# Patient Record
Sex: FEMALE | Race: WHITE | Employment: OTHER | URBAN - METROPOLITAN AREA
[De-identification: names, ages, dates, MRNs, and addresses within clinical notes are randomized per-mention and may not be internally consistent; named-entity substitution may affect disease eponyms.]

---

## 2018-06-16 ENCOUNTER — HOSPITAL ENCOUNTER (OUTPATIENT)
Age: 68
Discharge: HOME | End: 2018-06-18
Payer: MEDICARE

## 2018-06-16 ENCOUNTER — HOSPITAL ENCOUNTER (OUTPATIENT)
Dept: GENERAL RADIOLOGY | Age: 68
Discharge: HOME | End: 2018-06-18
Payer: MEDICARE

## 2018-06-16 DIAGNOSIS — Z09 FOLLOW UP: ICD-10-CM

## 2018-06-16 PROCEDURE — 71046 X-RAY EXAM CHEST 2 VIEWS: CPT

## 2019-05-20 ENCOUNTER — HOSPITAL ENCOUNTER (OUTPATIENT)
Dept: MAMMOGRAPHY | Age: 69
Discharge: HOME | End: 2019-05-22
Payer: MEDICARE

## 2019-05-20 DIAGNOSIS — Z12.39 BREAST CANCER SCREENING: ICD-10-CM

## 2019-05-20 PROCEDURE — 77063 BREAST TOMOSYNTHESIS BI: CPT

## 2019-06-28 ENCOUNTER — APPOINTMENT (OUTPATIENT)
Dept: GENERAL RADIOLOGY | Age: 69
End: 2019-06-28
Attending: FAMILY MEDICINE
Payer: MEDICARE

## 2019-06-28 ENCOUNTER — HOSPITAL ENCOUNTER (OUTPATIENT)
Age: 69
Setting detail: OBSERVATION
Discharge: HOME | End: 2019-06-29
Attending: FAMILY MEDICINE | Admitting: FAMILY MEDICINE
Payer: MEDICARE

## 2019-06-28 PROBLEM — R00.2 PALPITATIONS: Status: ACTIVE | Noted: 2019-06-28

## 2019-06-28 LAB
ANION GAP SERPL CALCULATED.3IONS-SCNC: 13 MMOL/L (ref 7–16)
BUN BLDV-MCNC: 8 MG/DL (ref 8–23)
CALCIUM SERPL-MCNC: 9.7 MG/DL (ref 8.6–10.2)
CHLORIDE BLD-SCNC: 105 MMOL/L (ref 98–107)
CK MB: 2.6 NG/ML (ref 0–4.3)
CO2: 24 MMOL/L (ref 22–29)
CREAT SERPL-MCNC: 0.8 MG/DL (ref 0.5–1)
GFR AFRICAN AMERICAN: >60
GFR NON-AFRICAN AMERICAN: >60 ML/MIN/1.73
GLUCOSE BLD-MCNC: 99 MG/DL (ref 74–99)
HCT VFR BLD CALC: 42.2 % (ref 34–48)
HEMOGLOBIN: 14 G/DL (ref 11.5–15.5)
MCH RBC QN AUTO: 29.2 PG (ref 26–35)
MCHC RBC AUTO-ENTMCNC: 33.2 % (ref 32–34.5)
MCV RBC AUTO: 88.1 FL (ref 80–99.9)
PDW BLD-RTO: 13.5 FL (ref 11.5–15)
PLATELET # BLD: 287 E9/L (ref 130–450)
PMV BLD AUTO: 9.6 FL (ref 7–12)
POTASSIUM SERPL-SCNC: 3.7 MMOL/L (ref 3.5–5)
RBC # BLD: 4.79 E12/L (ref 3.5–5.5)
SODIUM BLD-SCNC: 142 MMOL/L (ref 132–146)
TOTAL CK: 94 U/L (ref 20–180)
TROPONIN: <0.01 NG/ML (ref 0–0.03)
WBC # BLD: 5.3 E9/L (ref 4.5–11.5)

## 2019-06-28 PROCEDURE — G0378 HOSPITAL OBSERVATION PER HR: HCPCS

## 2019-06-28 PROCEDURE — 36415 COLL VENOUS BLD VENIPUNCTURE: CPT

## 2019-06-28 PROCEDURE — 84484 ASSAY OF TROPONIN QUANT: CPT

## 2019-06-28 PROCEDURE — 71046 X-RAY EXAM CHEST 2 VIEWS: CPT

## 2019-06-28 PROCEDURE — 6370000000 HC RX 637 (ALT 250 FOR IP): Performed by: FAMILY MEDICINE

## 2019-06-28 PROCEDURE — 80048 BASIC METABOLIC PNL TOTAL CA: CPT

## 2019-06-28 PROCEDURE — 85027 COMPLETE CBC AUTOMATED: CPT

## 2019-06-28 PROCEDURE — 82553 CREATINE MB FRACTION: CPT

## 2019-06-28 PROCEDURE — 93005 ELECTROCARDIOGRAM TRACING: CPT | Performed by: FAMILY MEDICINE

## 2019-06-28 PROCEDURE — 82550 ASSAY OF CK (CPK): CPT

## 2019-06-28 RX ORDER — AMOXICILLIN 500 MG/1
500 CAPSULE ORAL 3 TIMES DAILY
COMMUNITY
End: 2020-10-19

## 2019-06-28 RX ORDER — CITALOPRAM 10 MG/1
20 TABLET ORAL EVERY EVENING
Status: DISCONTINUED | OUTPATIENT
Start: 2019-06-28 | End: 2019-06-29 | Stop reason: HOSPADM

## 2019-06-28 RX ORDER — HYDROXYZINE PAMOATE 50 MG/1
50 CAPSULE ORAL EVERY 4 HOURS PRN
Status: DISCONTINUED | OUTPATIENT
Start: 2019-06-28 | End: 2019-06-29 | Stop reason: HOSPADM

## 2019-06-28 RX ORDER — HYDROXYZINE PAMOATE 50 MG/1
50 CAPSULE ORAL ONCE
Status: DISCONTINUED | OUTPATIENT
Start: 2019-06-28 | End: 2019-06-29 | Stop reason: HOSPADM

## 2019-06-28 RX ORDER — LORAZEPAM 2 MG/ML
0.5 INJECTION INTRAMUSCULAR EVERY 8 HOURS PRN
Status: DISCONTINUED | OUTPATIENT
Start: 2019-06-28 | End: 2019-06-29 | Stop reason: HOSPADM

## 2019-06-28 RX ADMIN — CITALOPRAM HYDROBROMIDE 20 MG: 10 TABLET ORAL at 21:47

## 2019-06-29 ENCOUNTER — APPOINTMENT (OUTPATIENT)
Dept: NUCLEAR MEDICINE | Age: 69
End: 2019-06-29
Attending: FAMILY MEDICINE
Payer: MEDICARE

## 2019-06-29 VITALS
HEIGHT: 70 IN | WEIGHT: 203.2 LBS | TEMPERATURE: 98.1 F | HEART RATE: 67 BPM | BODY MASS INDEX: 29.09 KG/M2 | SYSTOLIC BLOOD PRESSURE: 108 MMHG | OXYGEN SATURATION: 97 % | RESPIRATION RATE: 18 BRPM | DIASTOLIC BLOOD PRESSURE: 62 MMHG

## 2019-06-29 LAB
ALBUMIN SERPL-MCNC: 4.4 G/DL (ref 3.5–5.2)
ALP BLD-CCNC: 126 U/L (ref 35–104)
ALT SERPL-CCNC: 6 U/L (ref 0–32)
ANION GAP SERPL CALCULATED.3IONS-SCNC: 11 MMOL/L (ref 7–16)
AST SERPL-CCNC: 17 U/L (ref 0–31)
BILIRUB SERPL-MCNC: 0.4 MG/DL (ref 0–1.2)
BUN BLDV-MCNC: 11 MG/DL (ref 8–23)
CALCIUM SERPL-MCNC: 9.1 MG/DL (ref 8.6–10.2)
CHLORIDE BLD-SCNC: 106 MMOL/L (ref 98–107)
CHOLESTEROL, TOTAL: 183 MG/DL (ref 0–199)
CK MB: 2.1 NG/ML (ref 0–4.3)
CK MB: 2.4 NG/ML (ref 0–4.3)
CO2: 23 MMOL/L (ref 22–29)
CREAT SERPL-MCNC: 0.8 MG/DL (ref 0.5–1)
GFR AFRICAN AMERICAN: >60
GFR NON-AFRICAN AMERICAN: >60 ML/MIN/1.73
GLUCOSE BLD-MCNC: 107 MG/DL (ref 74–99)
HCT VFR BLD CALC: 42.3 % (ref 34–48)
HDLC SERPL-MCNC: 63 MG/DL
HEMOGLOBIN: 13.8 G/DL (ref 11.5–15.5)
LDL CHOLESTEROL CALCULATED: 106 MG/DL (ref 0–99)
LV EF: 71 %
LVEF MODALITY: NORMAL
MCH RBC QN AUTO: 28.6 PG (ref 26–35)
MCHC RBC AUTO-ENTMCNC: 32.6 % (ref 32–34.5)
MCV RBC AUTO: 87.6 FL (ref 80–99.9)
PDW BLD-RTO: 13.4 FL (ref 11.5–15)
PLATELET # BLD: 284 E9/L (ref 130–450)
PMV BLD AUTO: 9.5 FL (ref 7–12)
POTASSIUM SERPL-SCNC: 4.3 MMOL/L (ref 3.5–5)
RBC # BLD: 4.83 E12/L (ref 3.5–5.5)
SODIUM BLD-SCNC: 140 MMOL/L (ref 132–146)
TOTAL CK: 74 U/L (ref 20–180)
TOTAL CK: 83 U/L (ref 20–180)
TOTAL PROTEIN: 6 G/DL (ref 6.4–8.3)
TRIGL SERPL-MCNC: 72 MG/DL (ref 0–149)
TROPONIN: <0.01 NG/ML (ref 0–0.03)
TROPONIN: <0.01 NG/ML (ref 0–0.03)
TSH SERPL DL<=0.05 MIU/L-ACNC: 1.75 UIU/ML (ref 0.27–4.2)
VLDLC SERPL CALC-MCNC: 14 MG/DL
WBC # BLD: 4.6 E9/L (ref 4.5–11.5)

## 2019-06-29 PROCEDURE — 84484 ASSAY OF TROPONIN QUANT: CPT

## 2019-06-29 PROCEDURE — 80061 LIPID PANEL: CPT

## 2019-06-29 PROCEDURE — 84443 ASSAY THYROID STIM HORMONE: CPT

## 2019-06-29 PROCEDURE — 82553 CREATINE MB FRACTION: CPT

## 2019-06-29 PROCEDURE — G0378 HOSPITAL OBSERVATION PER HR: HCPCS

## 2019-06-29 PROCEDURE — 80053 COMPREHEN METABOLIC PANEL: CPT

## 2019-06-29 PROCEDURE — 93017 CV STRESS TEST TRACING ONLY: CPT

## 2019-06-29 PROCEDURE — 82550 ASSAY OF CK (CPK): CPT

## 2019-06-29 PROCEDURE — 3430000000 HC RX DIAGNOSTIC RADIOPHARMACEUTICAL: Performed by: RADIOLOGY

## 2019-06-29 PROCEDURE — A9500 TC99M SESTAMIBI: HCPCS | Performed by: RADIOLOGY

## 2019-06-29 PROCEDURE — 78452 HT MUSCLE IMAGE SPECT MULT: CPT

## 2019-06-29 PROCEDURE — 85027 COMPLETE CBC AUTOMATED: CPT

## 2019-06-29 PROCEDURE — 36415 COLL VENOUS BLD VENIPUNCTURE: CPT

## 2019-06-29 PROCEDURE — 6360000002 HC RX W HCPCS: Performed by: FAMILY MEDICINE

## 2019-06-29 RX ORDER — HYDROXYZINE PAMOATE 50 MG/1
50 CAPSULE ORAL EVERY 4 HOURS PRN
Qty: 1 CAPSULE | Refills: 0
Start: 2019-06-29 | End: 2019-07-13

## 2019-06-29 RX ORDER — CITALOPRAM 20 MG/1
20 TABLET ORAL EVERY EVENING
Qty: 1 TABLET | Refills: 0
Start: 2019-06-29 | End: 2020-10-19

## 2019-06-29 RX ADMIN — REGADENOSON 0.4 MG: 0.08 INJECTION, SOLUTION INTRAVENOUS at 10:56

## 2019-06-29 RX ADMIN — Medication 30 MILLICURIE: at 10:38

## 2019-06-29 RX ADMIN — Medication 10 MILLICURIE: at 08:50

## 2019-06-29 NOTE — PROGRESS NOTES
Per patient, Dr. Bhatt Levels previously gave prescriptions for new  Medications on discharge to her .   Electronically signed by Kashif Agrawal RN on 6/29/2019 at 2:07 PM

## 2019-06-29 NOTE — H&P
Bowel  sounds are positive. EXTREMITIES:  No clubbing, no edema, no cyanosis. Dorsalis 1+. NEUROLOGIC:  Cranial nerves II through XII intact distally, grossly  intact. No focal neurological deficits. Workup revealed the following - chest x-ray revealed no acute  cardiopulmonary disease. CBC is within normal limits, cardiac enzymes  were negative x1. ASSESSMENT:  1. Cardiac palpitations, SVT, and V-tach. 2.  Chest pain - rule out coronary artery disease. 3.  Severe anxiety. 4.  Panic attacks. PLAN:  Tollie Galilea will be started for the severe anxiety and this  will be administered for the underlying panic attacks. Nitroglycerin  will also be administered for underlying chest pains, cardiac enzymes  will be cycled. Heart monitor will be performed and a stress test will  be also ordered. The patient will be monitored very closely over the  next 24 hours.         Fior Yañez MD    D: 06/29/2019 9:58:22       T: 06/29/2019 10:09:55     [id number]  Job#: [id number]     Doc#: [id number]    CC:

## 2019-06-30 LAB
EKG ATRIAL RATE: 67 BPM
EKG P AXIS: 43 DEGREES
EKG P-R INTERVAL: 156 MS
EKG Q-T INTERVAL: 408 MS
EKG QRS DURATION: 82 MS
EKG QTC CALCULATION (BAZETT): 431 MS
EKG R AXIS: 25 DEGREES
EKG T AXIS: 88 DEGREES
EKG VENTRICULAR RATE: 67 BPM

## 2019-10-15 ENCOUNTER — HOSPITAL ENCOUNTER (OUTPATIENT)
Age: 69
Discharge: HOME | End: 2019-10-15
Payer: MEDICARE

## 2019-10-15 ENCOUNTER — HOSPITAL ENCOUNTER (OUTPATIENT)
Dept: ULTRASOUND IMAGING | Age: 69
Discharge: HOME | End: 2019-10-15
Payer: MEDICARE

## 2019-10-15 DIAGNOSIS — R60.9 EDEMA, UNSPECIFIED TYPE: ICD-10-CM

## 2019-10-15 PROCEDURE — 93971 EXTREMITY STUDY: CPT

## 2020-10-19 ENCOUNTER — APPOINTMENT (OUTPATIENT)
Dept: CT IMAGING | Age: 70
DRG: 392 | End: 2020-10-19
Payer: MEDICARE

## 2020-10-19 ENCOUNTER — HOSPITAL ENCOUNTER (INPATIENT)
Age: 70
LOS: 3 days | Discharge: HOME | DRG: 392 | End: 2020-10-22
Attending: EMERGENCY MEDICINE | Admitting: SURGERY
Payer: MEDICARE

## 2020-10-19 PROBLEM — K57.92 ACUTE DIVERTICULITIS: Status: ACTIVE | Noted: 2020-10-19

## 2020-10-19 LAB
ALBUMIN SERPL-MCNC: 4.2 G/DL (ref 3.5–5.2)
ALP BLD-CCNC: 136 U/L (ref 35–104)
ALT SERPL-CCNC: 10 U/L (ref 0–32)
ANION GAP SERPL CALCULATED.3IONS-SCNC: 9 MMOL/L (ref 7–16)
AST SERPL-CCNC: 26 U/L (ref 0–31)
BASOPHILS ABSOLUTE: 0.03 E9/L (ref 0–0.2)
BASOPHILS RELATIVE PERCENT: 0.2 % (ref 0–2)
BILIRUB SERPL-MCNC: 0.7 MG/DL (ref 0–1.2)
BILIRUBIN URINE: NEGATIVE
BLOOD, URINE: NEGATIVE
BUN BLDV-MCNC: 12 MG/DL (ref 8–23)
CALCIUM SERPL-MCNC: 9.4 MG/DL (ref 8.6–10.2)
CHLORIDE BLD-SCNC: 102 MMOL/L (ref 98–107)
CLARITY: CLEAR
CO2: 25 MMOL/L (ref 22–29)
COLOR: YELLOW
CREAT SERPL-MCNC: 0.8 MG/DL (ref 0.5–1)
EOSINOPHILS ABSOLUTE: 0.07 E9/L (ref 0.05–0.5)
EOSINOPHILS RELATIVE PERCENT: 0.5 % (ref 0–6)
GFR AFRICAN AMERICAN: >60
GFR NON-AFRICAN AMERICAN: >60 ML/MIN/1.73
GLUCOSE BLD-MCNC: 99 MG/DL (ref 74–99)
GLUCOSE URINE: NEGATIVE MG/DL
HCT VFR BLD CALC: 41.7 % (ref 34–48)
HEMOGLOBIN: 14.2 G/DL (ref 11.5–15.5)
IMMATURE GRANULOCYTES #: 0.06 E9/L
IMMATURE GRANULOCYTES %: 0.5 % (ref 0–5)
KETONES, URINE: NEGATIVE MG/DL
LACTIC ACID: 1.5 MMOL/L (ref 0.5–2.2)
LEUKOCYTE ESTERASE, URINE: NEGATIVE
LIPASE: 33 U/L (ref 13–60)
LYMPHOCYTES ABSOLUTE: 0.96 E9/L (ref 1.5–4)
LYMPHOCYTES RELATIVE PERCENT: 7.3 % (ref 20–42)
MCH RBC QN AUTO: 29.8 PG (ref 26–35)
MCHC RBC AUTO-ENTMCNC: 34.1 % (ref 32–34.5)
MCV RBC AUTO: 87.4 FL (ref 80–99.9)
MONOCYTES ABSOLUTE: 0.76 E9/L (ref 0.1–0.95)
MONOCYTES RELATIVE PERCENT: 5.8 % (ref 2–12)
NEUTROPHILS ABSOLUTE: 11.3 E9/L (ref 1.8–7.3)
NEUTROPHILS RELATIVE PERCENT: 85.7 % (ref 43–80)
NITRITE, URINE: NEGATIVE
PDW BLD-RTO: 13.6 FL (ref 11.5–15)
PH UA: 6.5 (ref 5–9)
PLATELET # BLD: 268 E9/L (ref 130–450)
PMV BLD AUTO: 9.3 FL (ref 7–12)
POTASSIUM REFLEX MAGNESIUM: 4 MMOL/L (ref 3.5–5)
PROTEIN UA: NEGATIVE MG/DL
RBC # BLD: 4.77 E12/L (ref 3.5–5.5)
SODIUM BLD-SCNC: 136 MMOL/L (ref 132–146)
SPECIFIC GRAVITY UA: 1.01 (ref 1–1.03)
TOTAL PROTEIN: 6.3 G/DL (ref 6.4–8.3)
TROPONIN: <0.01 NG/ML (ref 0–0.03)
UROBILINOGEN, URINE: 0.2 E.U./DL
WBC # BLD: 13.2 E9/L (ref 4.5–11.5)

## 2020-10-19 PROCEDURE — 96375 TX/PRO/DX INJ NEW DRUG ADDON: CPT

## 2020-10-19 PROCEDURE — 6360000002 HC RX W HCPCS: Performed by: SURGERY

## 2020-10-19 PROCEDURE — 85025 COMPLETE CBC W/AUTO DIFF WBC: CPT

## 2020-10-19 PROCEDURE — 6360000002 HC RX W HCPCS: Performed by: STUDENT IN AN ORGANIZED HEALTH CARE EDUCATION/TRAINING PROGRAM

## 2020-10-19 PROCEDURE — 83690 ASSAY OF LIPASE: CPT

## 2020-10-19 PROCEDURE — 84484 ASSAY OF TROPONIN QUANT: CPT

## 2020-10-19 PROCEDURE — 1200000000 HC SEMI PRIVATE

## 2020-10-19 PROCEDURE — 2580000003 HC RX 258: Performed by: SURGERY

## 2020-10-19 PROCEDURE — 81003 URINALYSIS AUTO W/O SCOPE: CPT

## 2020-10-19 PROCEDURE — 74177 CT ABD & PELVIS W/CONTRAST: CPT

## 2020-10-19 PROCEDURE — 93005 ELECTROCARDIOGRAM TRACING: CPT | Performed by: STUDENT IN AN ORGANIZED HEALTH CARE EDUCATION/TRAINING PROGRAM

## 2020-10-19 PROCEDURE — 99284 EMERGENCY DEPT VISIT MOD MDM: CPT

## 2020-10-19 PROCEDURE — 83605 ASSAY OF LACTIC ACID: CPT

## 2020-10-19 PROCEDURE — 2500000003 HC RX 250 WO HCPCS: Performed by: STUDENT IN AN ORGANIZED HEALTH CARE EDUCATION/TRAINING PROGRAM

## 2020-10-19 PROCEDURE — 2580000003 HC RX 258: Performed by: STUDENT IN AN ORGANIZED HEALTH CARE EDUCATION/TRAINING PROGRAM

## 2020-10-19 PROCEDURE — 6360000004 HC RX CONTRAST MEDICATION: Performed by: RADIOLOGY

## 2020-10-19 PROCEDURE — 36415 COLL VENOUS BLD VENIPUNCTURE: CPT

## 2020-10-19 PROCEDURE — 80053 COMPREHEN METABOLIC PANEL: CPT

## 2020-10-19 PROCEDURE — 96365 THER/PROPH/DIAG IV INF INIT: CPT

## 2020-10-19 RX ORDER — ONDANSETRON 2 MG/ML
4 INJECTION INTRAMUSCULAR; INTRAVENOUS ONCE
Status: COMPLETED | OUTPATIENT
Start: 2020-10-19 | End: 2020-10-19

## 2020-10-19 RX ORDER — MORPHINE SULFATE 4 MG/ML
4 INJECTION, SOLUTION INTRAMUSCULAR; INTRAVENOUS
Status: DISCONTINUED | OUTPATIENT
Start: 2020-10-19 | End: 2020-10-21

## 2020-10-19 RX ORDER — SODIUM CHLORIDE 0.9 % (FLUSH) 0.9 %
10 SYRINGE (ML) INJECTION EVERY 12 HOURS SCHEDULED
Status: DISCONTINUED | OUTPATIENT
Start: 2020-10-19 | End: 2020-10-22 | Stop reason: HOSPADM

## 2020-10-19 RX ORDER — SODIUM CHLORIDE 0.9 % (FLUSH) 0.9 %
10 SYRINGE (ML) INJECTION PRN
Status: DISCONTINUED | OUTPATIENT
Start: 2020-10-19 | End: 2020-10-22 | Stop reason: HOSPADM

## 2020-10-19 RX ORDER — OXYCODONE HYDROCHLORIDE 5 MG/1
5 TABLET ORAL EVERY 4 HOURS PRN
Status: DISCONTINUED | OUTPATIENT
Start: 2020-10-19 | End: 2020-10-22 | Stop reason: HOSPADM

## 2020-10-19 RX ORDER — FENTANYL CITRATE 50 UG/ML
25 INJECTION, SOLUTION INTRAMUSCULAR; INTRAVENOUS ONCE
Status: COMPLETED | OUTPATIENT
Start: 2020-10-19 | End: 2020-10-19

## 2020-10-19 RX ORDER — MORPHINE SULFATE 2 MG/ML
2 INJECTION, SOLUTION INTRAMUSCULAR; INTRAVENOUS
Status: DISCONTINUED | OUTPATIENT
Start: 2020-10-19 | End: 2020-10-21

## 2020-10-19 RX ORDER — SODIUM CHLORIDE 9 MG/ML
INJECTION, SOLUTION INTRAVENOUS CONTINUOUS
Status: DISCONTINUED | OUTPATIENT
Start: 2020-10-19 | End: 2020-10-22 | Stop reason: HOSPADM

## 2020-10-19 RX ORDER — ONDANSETRON 2 MG/ML
4 INJECTION INTRAMUSCULAR; INTRAVENOUS EVERY 6 HOURS PRN
Status: DISCONTINUED | OUTPATIENT
Start: 2020-10-19 | End: 2020-10-22 | Stop reason: HOSPADM

## 2020-10-19 RX ORDER — PANTOPRAZOLE SODIUM 40 MG/10ML
40 INJECTION, POWDER, LYOPHILIZED, FOR SOLUTION INTRAVENOUS DAILY
Status: DISCONTINUED | OUTPATIENT
Start: 2020-10-20 | End: 2020-10-22 | Stop reason: HOSPADM

## 2020-10-19 RX ORDER — SODIUM CHLORIDE 9 MG/ML
10 INJECTION INTRAVENOUS DAILY
Status: DISCONTINUED | OUTPATIENT
Start: 2020-10-20 | End: 2020-10-22 | Stop reason: HOSPADM

## 2020-10-19 RX ORDER — 0.9 % SODIUM CHLORIDE 0.9 %
1000 INTRAVENOUS SOLUTION INTRAVENOUS ONCE
Status: COMPLETED | OUTPATIENT
Start: 2020-10-19 | End: 2020-10-19

## 2020-10-19 RX ORDER — ONDANSETRON 4 MG/1
4 TABLET, ORALLY DISINTEGRATING ORAL EVERY 8 HOURS PRN
Status: DISCONTINUED | OUTPATIENT
Start: 2020-10-19 | End: 2020-10-22 | Stop reason: HOSPADM

## 2020-10-19 RX ORDER — OXYCODONE HYDROCHLORIDE 5 MG/1
10 TABLET ORAL EVERY 4 HOURS PRN
Status: DISCONTINUED | OUTPATIENT
Start: 2020-10-19 | End: 2020-10-22 | Stop reason: HOSPADM

## 2020-10-19 RX ADMIN — METRONIDAZOLE 500 MG: 500 INJECTION, SOLUTION INTRAVENOUS at 20:17

## 2020-10-19 RX ADMIN — MORPHINE SULFATE 2 MG: 2 INJECTION, SOLUTION INTRAMUSCULAR; INTRAVENOUS at 23:27

## 2020-10-19 RX ADMIN — ONDANSETRON 4 MG: 2 INJECTION INTRAMUSCULAR; INTRAVENOUS at 18:28

## 2020-10-19 RX ADMIN — IOPAMIDOL 75 ML: 755 INJECTION, SOLUTION INTRAVENOUS at 19:42

## 2020-10-19 RX ADMIN — SODIUM CHLORIDE: 9 INJECTION, SOLUTION INTRAVENOUS at 23:26

## 2020-10-19 RX ADMIN — WATER 1 G: 1 INJECTION INTRAMUSCULAR; INTRAVENOUS; SUBCUTANEOUS at 20:17

## 2020-10-19 RX ADMIN — Medication 10 ML: at 22:15

## 2020-10-19 RX ADMIN — SODIUM CHLORIDE 1000 ML: 9 INJECTION, SOLUTION INTRAVENOUS at 18:28

## 2020-10-19 RX ADMIN — FENTANYL CITRATE 25 MCG: 50 INJECTION, SOLUTION INTRAMUSCULAR; INTRAVENOUS at 18:29

## 2020-10-19 ASSESSMENT — PAIN SCALES - GENERAL
PAINLEVEL_OUTOF10: 5
PAINLEVEL_OUTOF10: 8
PAINLEVEL_OUTOF10: 8
PAINLEVEL_OUTOF10: 0
PAINLEVEL_OUTOF10: 6
PAINLEVEL_OUTOF10: 9

## 2020-10-19 ASSESSMENT — ENCOUNTER SYMPTOMS
BACK PAIN: 0
APNEA: 0
EYE PAIN: 0
DIARRHEA: 0
RHINORRHEA: 0
COUGH: 0
CONSTIPATION: 0
SHORTNESS OF BREATH: 0
NAUSEA: 1
PHOTOPHOBIA: 0
CHEST TIGHTNESS: 0
TROUBLE SWALLOWING: 0
VOMITING: 0
SORE THROAT: 0
WHEEZING: 0
ABDOMINAL PAIN: 1

## 2020-10-19 ASSESSMENT — PAIN DESCRIPTION - PAIN TYPE
TYPE: ACUTE PAIN
TYPE: ACUTE PAIN

## 2020-10-19 ASSESSMENT — PAIN DESCRIPTION - ORIENTATION
ORIENTATION: RIGHT
ORIENTATION: LEFT;LOWER

## 2020-10-19 ASSESSMENT — PAIN DESCRIPTION - LOCATION
LOCATION: ABDOMEN

## 2020-10-19 ASSESSMENT — PAIN DESCRIPTION - DESCRIPTORS
DESCRIPTORS: ACHING;DISCOMFORT;NAGGING
DESCRIPTORS: ACHING;DISCOMFORT;SHOOTING
DESCRIPTORS: ACHING;SHARP

## 2020-10-19 ASSESSMENT — PAIN - FUNCTIONAL ASSESSMENT
PAIN_FUNCTIONAL_ASSESSMENT: PREVENTS OR INTERFERES SOME ACTIVE ACTIVITIES AND ADLS
PAIN_FUNCTIONAL_ASSESSMENT: PREVENTS OR INTERFERES SOME ACTIVE ACTIVITIES AND ADLS

## 2020-10-19 ASSESSMENT — PAIN DESCRIPTION - FREQUENCY: FREQUENCY: INTERMITTENT

## 2020-10-19 NOTE — ED PROVIDER NOTES
[address] [facility]      Pt Name: Katelin Stephens  MRN: [id number]  [place] 1950  Date of evaluation: 10/19/2020      CHIEF COMPLAINT       Chief Complaint   Patient presents with    Abdominal Pain     LLQ, started this am     Nausea        HPI  Katelin Stephens is a 77 y.o. female who presents to the emergency department with complaints of left lower quadrant abdominal pain. The patient states that this morning she began to have gradual onset left lower quadrant abdominal pain that is worsened throughout the day. She describes the pain as moderate, a \"grabbing\" achy pain. Nothing seems to make it better or worse. She has associated mild nausea but no vomiting. She denies any fevers, chills, lightheadedness, syncope, chest pain or shortness of breath. She had diverticulitis once in the past but is unsure if this feels like that. History of cholecystectomy no other abdominal surgeries. She denies any urinary symptoms. Denies any diarrhea, constipation, black or bloody stools. Except as noted above the remainder of the review of systems was reviewed and negative. Review of Systems   Constitutional: Negative for chills, diaphoresis, fatigue and fever. HENT: Negative for rhinorrhea, sore throat and trouble swallowing. Eyes: Negative for photophobia and pain. Respiratory: Negative for apnea, cough, chest tightness, shortness of breath and wheezing. Cardiovascular: Negative for chest pain, palpitations and leg swelling. Gastrointestinal: Positive for abdominal pain and nausea. Negative for constipation, diarrhea and vomiting. Endocrine: Negative for polyuria. Genitourinary: Negative for difficulty urinating and dysuria. Musculoskeletal: Negative for back pain, neck pain and neck stiffness. Skin: Negative for pallor and rash. Neurological: Negative for dizziness, speech difficulty, weakness, light-headedness and headaches. Psychiatric/Behavioral: Negative for confusion. The patient is not nervous/anxious. Physical Exam  Vitals signs and nursing note reviewed. Constitutional:       General: She is not in acute distress. Appearance: She is well-developed. Comments: Awake and alert. Sitting in the gurney in no obvious distress. HENT:      Head: Normocephalic and atraumatic. Right Ear: External ear normal.      Left Ear: External ear normal.      Mouth/Throat:      Mouth: Mucous membranes are moist.   Eyes:      General: No scleral icterus. Pupils: Pupils are equal, round, and reactive to light. Neck:      Musculoskeletal: Normal range of motion and neck supple. Cardiovascular:      Rate and Rhythm: Normal rate and regular rhythm. Heart sounds: No murmur. Comments: 2+ radial and dorsal pedis pulses bilaterally  Pulmonary:      Effort: Pulmonary effort is normal. No respiratory distress. Breath sounds: Normal breath sounds. No wheezing. Abdominal:      Palpations: Abdomen is soft. Tenderness: There is abdominal tenderness. There is no guarding or rebound. Comments: Left lower quadrant abdominal tenderness to palpation. No rebound or guarding. Musculoskeletal: Normal range of motion. General: No tenderness or deformity. Right lower leg: No edema. Left lower leg: No edema. Skin:     General: Skin is warm and dry. Capillary Refill: Capillary refill takes less than 2 seconds. Neurological:      General: No focal deficit present. Mental Status: She is alert and oriented to person, place, and time. Cranial Nerves: No cranial nerve deficit. Sensory: No sensory deficit. Motor: No weakness or abnormal muscle tone.    Psychiatric:         Mood and Affect: Mood normal.         Behavior: Behavior normal.          Procedures     MDM   This is a 46-year-old female with a history of previous diverticulitis who presents to the emergency department with complaints of acute onset left lower quadrant abdominal pain. In the emergency department the patient is awake and alert, hemodynamically stable, afebrile and in no respiratory distress. CT abdomen pelvis with IV contrast with acute diverticulitis in the descending colon with signs of perforation. No abscess. Metabolic panel shows normal electrolytes, normal renal function with creatinine of 0.8. Lactic acid reassuring at 1.5. EKG showed no ischemic changes troponin was negative and less likely acute cardiac etiology of her symptoms. CBC showed leukocytosis of 13.2 with left shift. Discussed the case with Dr. Dylon Ritchie, general surgery who accepts the patient for further evaluation giving the signs of perforation on CT scan. Patient's pain adequately controlled in the emergency department and she had no further nausea or vomiting. Discussed results with her and she is agreeable to plan for admission. Patient started on Rocephin and Flagyl in the emergency department. ED Course as of Oct 19 2236   Mon Oct 19, 2020   1752 ATTENDING PROVIDER ATTESTATION:     Keiko Stapleton presented to the emergency department for evaluation of [unfilled]  I have reviewed and discussed the case, including pertinent history (medical, surgical, family and social) and exam findings with the Midlevel and the Nurse assigned to Keiko Stapleton. I have personally performed and/or participated in the history, exam, medical decision making, and procedures and agree with all pertinent clinical information. I have reviewed my findings and recommendations with Keiko Daya and members of family present at the time of disposition. My findings/plan: Patient is a 78-year-old female who presents with chief complaint of nausea and abdominal pain.   Patient states that earlier today she had onset of left lower quadrant abdominal pain that was more of a dull sensation that has become more constant dull pain that is intermittently sharp. The pain is nonradiating. She does admit to nausea but no vomiting. Patient does have a history of diverticulitis in the past.  No abdominal surgeries. She denies any fevers, chills, chest pain, shortness of breath, vomiting, dysuria, hematuria, diarrhea, constipation. Nothing makes her symptoms better or worse. On examination she is resting comfortably in bed. Normal HEENT exam.  Clear breath sounds in all lung fields. Regular rate and rhythm of the heart. Left lower quadrant abdominal tenderness to palpation. Abdomen is soft, nondistended. No guarding rigidity. No rebound tenderness. No lower extremity edema. Patient is awake, alert, and oriented x3. No focal neurological deficits. Yazmin Bonilla DO      [MS]      ED Course User Index  [MS] Agus MckinneyDO lucy       --------------------------------------------- PAST HISTORY ---------------------------------------------  Past Medical History:  has a past medical history of Diverticulitis, H/O cardiovascular stress test, and Pneumonia. Past Surgical History:  has a past surgical history that includes gastrectomy and Cholecystectomy. Social History:  reports that she has never smoked. She has never used smokeless tobacco. She reports that she does not drink alcohol or use drugs. Family History: family history includes Other in her father and mother. The patients home medications have been reviewed.     Allergies: Demerol and Levofloxacin    -------------------------------------------------- RESULTS -------------------------------------------------    LABS:  Results for orders placed or performed during the hospital encounter of 10/19/20   CBC Auto Differential   Result Value Ref Range    WBC 13.2 (H) 4.5 - 11.5 E9/L    RBC 4.77 3.50 - 5.50 E12/L    Hemoglobin 14.2 11.5 - 15.5 g/dL    Hematocrit 41.7 34.0 - 48.0 %    MCV 87.4 80.0 - 99.9 fL    MCH 29.8 26.0 - 35.0 pg    MCHC 34.1 32.0 - 34.5 % RDW 13.6 11.5 - 15.0 fL    Platelets 182 050 - 737 E9/L    MPV 9.3 7.0 - 12.0 fL    Neutrophils % 85.7 (H) 43.0 - 80.0 %    Immature Granulocytes % 0.5 0.0 - 5.0 %    Lymphocytes % 7.3 (L) 20.0 - 42.0 %    Monocytes % 5.8 2.0 - 12.0 %    Eosinophils % 0.5 0.0 - 6.0 %    Basophils % 0.2 0.0 - 2.0 %    Neutrophils Absolute 11.30 (H) 1.80 - 7.30 E9/L    Immature Granulocytes # 0.06 E9/L    Lymphocytes Absolute 0.96 (L) 1.50 - 4.00 E9/L    Monocytes Absolute 0.76 0.10 - 0.95 E9/L    Eosinophils Absolute 0.07 0.05 - 0.50 E9/L    Basophils Absolute 0.03 0.00 - 0.20 E9/L   Comprehensive Metabolic Panel w/ Reflex to MG   Result Value Ref Range    Sodium 136 132 - 146 mmol/L    Potassium reflex Magnesium 4.0 3.5 - 5.0 mmol/L    Chloride 102 98 - 107 mmol/L    CO2 25 22 - 29 mmol/L    Anion Gap 9 7 - 16 mmol/L    Glucose 99 74 - 99 mg/dL    BUN 12 8 - 23 mg/dL    CREATININE 0.8 0.5 - 1.0 mg/dL    GFR Non-African American >60 >=60 mL/min/1.73    GFR African American >60     Calcium 9.4 8.6 - 10.2 mg/dL    Total Protein 6.3 (L) 6.4 - 8.3 g/dL    Alb 4.2 3.5 - 5.2 g/dL    Total Bilirubin 0.7 0.0 - 1.2 mg/dL    Alkaline Phosphatase 136 (H) 35 - 104 U/L    ALT 10 0 - 32 U/L    AST 26 0 - 31 U/L   Lipase   Result Value Ref Range    Lipase 33 13 - 60 U/L   Troponin   Result Value Ref Range    Troponin <0.01 0.00 - 0.03 ng/mL   Urinalysis, reflex to microscopic   Result Value Ref Range    Color, UA Yellow Straw/Yellow    Clarity, UA Clear Clear    Glucose, Ur Negative Negative mg/dL    Bilirubin Urine Negative Negative    Ketones, Urine Negative Negative mg/dL    Specific Gravity, UA 1.010 1.005 - 1.030    Blood, Urine Negative Negative    pH, UA 6.5 5.0 - 9.0    Protein, UA Negative Negative mg/dL    Urobilinogen, Urine 0.2 <2.0 E.U./dL    Nitrite, Urine Negative Negative    Leukocyte Esterase, Urine Negative Negative   Lactic Acid, Plasma   Result Value Ref Range    Lactic Acid 1.5 0.5 - 2.2 mmol/L   EKG 12 Lead   Result Value Ref Range    Ventricular Rate 71 BPM    Atrial Rate 241 BPM    QRS Duration 78 ms    Q-T Interval 354 ms    QTc Calculation (Bazett) 384 ms    P Axis 39 degrees    R Axis -28 degrees    T Axis 72 degrees       RADIOLOGY:  CT ABDOMEN PELVIS W IV CONTRAST Additional Contrast? None   Final Result   Acute diverticulitis of the distal descending colon/proximal sigmoid colon   with perforation. No pericolonic abscess identified. Appendix is normal in appearance. EKG:  This EKG is signed and interpreted by me. Rate: 71bpm  Rhythm: sinus  Interpretation: Normal axis. No ST segment elevation or depression. Artifact. Nonspecific T wave changes. Comparison: Compared to EKG from June 20, 2019 new artifact today no other clear changes. ------------------------- NURSING NOTES AND VITALS REVIEWED ---------------------------  Date / Time Roomed:  10/19/2020  5:11 PM  ED Bed Assignment:  [id number]/0338-02    The nursing notes within the ED encounter and vital signs as below have been reviewed. Patient Vitals for the past 24 hrs:   BP Temp Temp src Pulse Resp SpO2 Height Weight   10/19/20 2145 128/68 98.8 °F (37.1 °C) Oral 78 16 98 % 5' 10\" (1.778 m) 200 lb (90.7 kg)   10/19/20 2016 134/82 -- -- 78 26 98 % -- --   10/19/20 1914 -- -- -- 82 17 99 % -- --   10/19/20 1913 -- -- -- 74 19 99 % -- --   10/19/20 1912 -- -- -- 75 19 98 % -- --   10/19/20 1911 -- -- -- 80 17 98 % -- --   10/19/20 1737 (!) 148/80 -- -- 72 18 98 % -- --   10/19/20 1624 (!) 168/82 98 °F (36.7 °C) Oral 85 22 98 % -- --   10/19/20 1611 -- 96.4 °F (35.8 °C) Tympanic -- -- -- -- --       Oxygen Saturation Interpretation: Normal    ------------------------------------------ PROGRESS NOTES ------------------------------------------    Counseling:  I have spoken with the patient and discussed todays results, in addition to providing specific details for the plan of care and counseling regarding the diagnosis and prognosis.   Their questions are

## 2020-10-20 PROBLEM — K57.20 PERFORATED DIVERTICULUM OF LARGE INTESTINE: Status: ACTIVE | Noted: 2020-10-20

## 2020-10-20 LAB
ANION GAP SERPL CALCULATED.3IONS-SCNC: 10 MMOL/L (ref 7–16)
BASOPHILS ABSOLUTE: 0.03 E9/L (ref 0–0.2)
BASOPHILS RELATIVE PERCENT: 0.2 % (ref 0–2)
BUN BLDV-MCNC: 10 MG/DL (ref 8–23)
CALCIUM SERPL-MCNC: 8.7 MG/DL (ref 8.6–10.2)
CHLORIDE BLD-SCNC: 107 MMOL/L (ref 98–107)
CO2: 22 MMOL/L (ref 22–29)
CREAT SERPL-MCNC: 0.8 MG/DL (ref 0.5–1)
EKG ATRIAL RATE: 241 BPM
EKG P AXIS: 39 DEGREES
EKG Q-T INTERVAL: 354 MS
EKG QRS DURATION: 78 MS
EKG QTC CALCULATION (BAZETT): 384 MS
EKG R AXIS: -28 DEGREES
EKG T AXIS: 72 DEGREES
EKG VENTRICULAR RATE: 71 BPM
EOSINOPHILS ABSOLUTE: 0.07 E9/L (ref 0.05–0.5)
EOSINOPHILS RELATIVE PERCENT: 0.5 % (ref 0–6)
GFR AFRICAN AMERICAN: >60
GFR NON-AFRICAN AMERICAN: >60 ML/MIN/1.73
GLUCOSE BLD-MCNC: 117 MG/DL (ref 74–99)
HCT VFR BLD CALC: 37.7 % (ref 34–48)
HEMOGLOBIN: 12.8 G/DL (ref 11.5–15.5)
IMMATURE GRANULOCYTES #: 0.06 E9/L
IMMATURE GRANULOCYTES %: 0.5 % (ref 0–5)
LACTIC ACID, SEPSIS: 0.9 MMOL/L (ref 0.5–1.9)
LACTIC ACID, SEPSIS: 0.9 MMOL/L (ref 0.5–1.9)
LYMPHOCYTES ABSOLUTE: 1.29 E9/L (ref 1.5–4)
LYMPHOCYTES RELATIVE PERCENT: 10.1 % (ref 20–42)
MCH RBC QN AUTO: 29.7 PG (ref 26–35)
MCHC RBC AUTO-ENTMCNC: 34 % (ref 32–34.5)
MCV RBC AUTO: 87.5 FL (ref 80–99.9)
MONOCYTES ABSOLUTE: 1.08 E9/L (ref 0.1–0.95)
MONOCYTES RELATIVE PERCENT: 8.4 % (ref 2–12)
NEUTROPHILS ABSOLUTE: 10.29 E9/L (ref 1.8–7.3)
NEUTROPHILS RELATIVE PERCENT: 80.3 % (ref 43–80)
PDW BLD-RTO: 13.7 FL (ref 11.5–15)
PLATELET # BLD: 239 E9/L (ref 130–450)
PMV BLD AUTO: 9.5 FL (ref 7–12)
POTASSIUM REFLEX MAGNESIUM: 3.9 MMOL/L (ref 3.5–5)
RBC # BLD: 4.31 E12/L (ref 3.5–5.5)
SODIUM BLD-SCNC: 139 MMOL/L (ref 132–146)
WBC # BLD: 12.8 E9/L (ref 4.5–11.5)

## 2020-10-20 PROCEDURE — 85025 COMPLETE CBC W/AUTO DIFF WBC: CPT

## 2020-10-20 PROCEDURE — 2580000003 HC RX 258: Performed by: SURGERY

## 2020-10-20 PROCEDURE — 83605 ASSAY OF LACTIC ACID: CPT

## 2020-10-20 PROCEDURE — 1200000000 HC SEMI PRIVATE

## 2020-10-20 PROCEDURE — 93010 ELECTROCARDIOGRAM REPORT: CPT | Performed by: INTERNAL MEDICINE

## 2020-10-20 PROCEDURE — C9113 INJ PANTOPRAZOLE SODIUM, VIA: HCPCS | Performed by: SURGERY

## 2020-10-20 PROCEDURE — 99223 1ST HOSP IP/OBS HIGH 75: CPT | Performed by: SURGERY

## 2020-10-20 PROCEDURE — 36415 COLL VENOUS BLD VENIPUNCTURE: CPT

## 2020-10-20 PROCEDURE — 2500000003 HC RX 250 WO HCPCS: Performed by: SURGERY

## 2020-10-20 PROCEDURE — 6360000002 HC RX W HCPCS: Performed by: SURGERY

## 2020-10-20 PROCEDURE — 80048 BASIC METABOLIC PNL TOTAL CA: CPT

## 2020-10-20 PROCEDURE — 87040 BLOOD CULTURE FOR BACTERIA: CPT

## 2020-10-20 RX ORDER — SODIUM CHLORIDE 0.9 % (FLUSH) 0.9 %
10 SYRINGE (ML) INJECTION PRN
Status: DISCONTINUED | OUTPATIENT
Start: 2020-10-20 | End: 2020-10-22 | Stop reason: HOSPADM

## 2020-10-20 RX ORDER — SODIUM CHLORIDE 0.9 % (FLUSH) 0.9 %
10 SYRINGE (ML) INJECTION EVERY 12 HOURS SCHEDULED
Status: DISCONTINUED | OUTPATIENT
Start: 2020-10-20 | End: 2020-10-20 | Stop reason: SDUPTHER

## 2020-10-20 RX ADMIN — SODIUM CHLORIDE: 9 INJECTION, SOLUTION INTRAVENOUS at 15:51

## 2020-10-20 RX ADMIN — METRONIDAZOLE 500 MG: 500 INJECTION, SOLUTION INTRAVENOUS at 12:05

## 2020-10-20 RX ADMIN — METRONIDAZOLE 500 MG: 500 INJECTION, SOLUTION INTRAVENOUS at 04:15

## 2020-10-20 RX ADMIN — METRONIDAZOLE 500 MG: 500 INJECTION, SOLUTION INTRAVENOUS at 19:50

## 2020-10-20 RX ADMIN — ENOXAPARIN SODIUM 40 MG: 40 INJECTION SUBCUTANEOUS at 08:08

## 2020-10-20 RX ADMIN — PANTOPRAZOLE SODIUM 40 MG: 40 INJECTION, POWDER, FOR SOLUTION INTRAVENOUS at 08:08

## 2020-10-20 RX ADMIN — SODIUM CHLORIDE, PRESERVATIVE FREE 10 ML: 5 INJECTION INTRAVENOUS at 08:09

## 2020-10-20 RX ADMIN — WATER 1 G: 1 INJECTION INTRAMUSCULAR; INTRAVENOUS; SUBCUTANEOUS at 19:40

## 2020-10-20 RX ADMIN — SODIUM CHLORIDE: 9 INJECTION, SOLUTION INTRAVENOUS at 07:21

## 2020-10-20 ASSESSMENT — ENCOUNTER SYMPTOMS
COUGH: 0
VOMITING: 0
SHORTNESS OF BREATH: 0
DIARRHEA: 0
NAUSEA: 1

## 2020-10-20 ASSESSMENT — PAIN SCALES - GENERAL
PAINLEVEL_OUTOF10: 0
PAINLEVEL_OUTOF10: 0

## 2020-10-20 NOTE — H&P
General Surgery History and Physical  [facility]    Patient's Name/Date of Birth: Jonh Ring / 1950    Date: October 20, 2020     Surgeon: Alexander Cobian MD    PCP: Melonie Morris MD     Chief Complaint: diverticulitis    HPI:   Jonh Ring is a 77 y.o. female who presents for evaluation of LLQ abdominal pain for one day. Her symptoms started early on the day of admission around 9 a.m. when she started experiencing severe excruciating  left lower quadrant abdominal pain. Pain persisted over the next  several hours and prompted her to come to the emergency room. She had associated nausea and vomiting. She also has had constipation. In the emergency room, she had mild leukocytosis of 13. CT abdomen pelvis revealed acute sigmoid diverticulitis with perforation and air adjacent to the sigmoid colon. No pericolonic abscess was found. She was admitted for further care. Patient Active Problem List   Diagnosis    Palpitations    Acute diverticulitis       Past Medical History:   Diagnosis Date    Diverticulitis     H/O cardiovascular stress test 06/29/2019    Lexiscan    Pneumonia        Past Surgical History:   Procedure Laterality Date    CHOLECYSTECTOMY      GASTRECTOMY         Allergies   Allergen Reactions    Demerol     Levofloxacin        The patient has a family history that is negative for severe cardiovascular or respiratory issues, negative for reaction to anesthesia. Time spent reviewing past medical, surgical, social and family history, vitals, nursing assessment and images. No changes from above documented history.     Social History     Socioeconomic History    Marital status:      Spouse name: Not on file    Number of children: Not on file    Years of education: Not on file    Highest education level: Not on file   Occupational History    Not on file   Social Needs    Financial resource strain: Not on file    Food insecurity     Worry: Not on file     Inability: Not on file    Transportation needs     Medical: Not on file     Non-medical: Not on file   Tobacco Use    Smoking status: Never Smoker    Smokeless tobacco: Never Used   Substance and Sexual Activity    Alcohol use: No     Comment: SOC    Drug use: No    Sexual activity: Never   Lifestyle    Physical activity     Days per week: Not on file     Minutes per session: Not on file    Stress: Not on file   Relationships    Social connections     Talks on phone: Not on file     Gets together: Not on file     Attends [employer] service: Not on file     Active member of club or organization: Not on file     Attends meetings of clubs or organizations: Not on file     Relationship status: Not on file    Intimate partner violence     Fear of current or ex partner: Not on file     Emotionally abused: Not on file     Physically abused: Not on file     Forced sexual activity: Not on file   Other Topics Concern    Not on file   Social History Narrative    Not on file       I have reviewed relevant labs from this admission and interpretation is included in my assessment and plan    Review of Systems    A complete 10 system review was performed and are otherwise negative unless mentioned in the above HPI. Specific negatives are listed below but may not include all those reviewed.     General ROS: negative obtundation, AMS  ENT ROS: negative rhinorrhea, epistaxis  Allergy and Immunology ROS: negative itchy/watery eyes or nasal congestion  Hematological and Lymphatic ROS: negative spontaneous bleeding or bruising  Endocrine ROS: negative  lethargy, mood swings, palpitations or polydipsia/polyuria  Respiratory ROS: negative sputum changes, stridor, tachypnea or wheezing  Cardiovascular ROS: negative for - loss of consciousness, murmur or orthopnea  Gastrointestinal ROS: negative for - hematochezia or hematemesis  Genito-Urinary ROS: negative for -  genital discharge or hematuria  Musculoskeletal ROS: negative for - focal weakness, gangrene  Psych/Neuro ROS: negative for - visual or auditory hallucinations, suicidal ideation    Physical exam:   /62   Pulse 62   Temp 98.4 °F (36.9 °C) (Oral)   Resp 14   Ht 5' 10\" (1.778 m)   Wt 205 lb (93 kg)   SpO2 94%   Breastfeeding No   BMI 29.41 kg/m²   General appearance:  NAD, appears stated age  Head: NCAT, PERRLA, EOMI, red conjunctiva  Neck: supple, no masses, trachea midline  Lungs: Equal chest rise bilateral, no retractions, no wheezing  Heart: Reg rate  Abdomen: soft, tender mild left lower quadrant, no peritonitis, nondistended  Skin; warm and dry, no cyanosis  Gu: no cva tenderness  Extremities: atraumatic, no focal motor deficits, no open wounds  Psych: No tremor, visual hallucinations      Radiology: I reviewed relevant abdominal imaging from this admission and that available in the EMR including CT abd/pel from 10/19/2020. My assessment is acute diverticulitis with perforation, no pelvic free fluid    Assessment:  Hunter Olmedo is a 77 y.o. female with perforated diverticulitis without abscess, focal peritonitis  Patient Active Problem List   Diagnosis    Palpitations    Acute diverticulitis         Plan:  IV antibiotics  Pain control  Clear liquid diet  Monitor leukocytosis, improving today  May repeat CT abdomen pelvis in the next 1 to 2 days to monitor for formation of abscess  We will need colonoscopy in 4 to 6 weeks after discharge as patient has never had colonoscopy and this is a second episode of diverticulitis.       Daryle Krabbe, MD  8:25 AM  10/20/2020

## 2020-10-20 NOTE — CARE COORDINATION
CM note: Met with patient in room for transition of care planning. Pt lives with spouse, is independent with ADLs, still works/drives. She is denying any discharge needs at this time. PCP is Dr. Judi Morris and she utilizes [address] [place], [place] [address] for medications.

## 2020-10-20 NOTE — PLAN OF CARE
Problem: Pain:  Goal: Pain level will decrease  Description: Pain level will decrease  10/20/2020 1349 by Janet Alexandre RN  Outcome: Met This Shift  10/20/2020 0321 by Huey Swann RN  Outcome: Met This Shift  Goal: Control of acute pain  Description: Control of acute pain  10/20/2020 1349 by Janet Alexandre RN  Outcome: Met This Shift  10/20/2020 0321 by Huey Swann RN  Outcome: Met This Shift  Goal: Control of chronic pain  Description: Control of chronic pain  10/20/2020 1349 by Janet Alexandre RN  Outcome: Met This Shift  10/20/2020 0321 by Huey Swann RN  Outcome: Met This Shift     Problem: Activity:  Goal: Risk for activity intolerance will decrease  Description: Risk for activity intolerance will decrease  10/20/2020 1349 by Janet Alexandre RN  Outcome: Met This Shift  10/20/2020 0321 by Huey Swann RN  Outcome: Met This Shift     Problem:  Bowel/Gastric:  Goal: Bowel function will improve  Description: Bowel function will improve  10/20/2020 1349 by Janet Alexandre RN  Outcome: Met This Shift  10/20/2020 0321 by Huey Swann RN  Outcome: Met This Shift  Goal: Occurrences of nausea will decrease  Description: Occurrences of nausea will decrease  10/20/2020 1349 by Janet Alexandre RN  Outcome: Met This Shift  10/20/2020 0321 by Huey Swann RN  Outcome: Met This Shift  Goal: Occurrences of vomiting will decrease  Description: Occurrences of vomiting will decrease  10/20/2020 1349 by Janet Alexandre RN  Outcome: Met This Shift  10/20/2020 0321 by Huey Swann RN  Outcome: Met This Shift     Problem: Health Behavior:  Goal: Ability to state signs and symptoms to report to health care provider will improve  Description: Ability to state signs and symptoms to report to health care provider will improve  10/20/2020 1349 by Janet Alexandre RN  Outcome: Met This Shift  10/20/2020 0321 by Huey Swann RN  Outcome: Met This Shift     Problem: Physical Regulation:  Goal: Ability to maintain clinical measurements within normal limits

## 2020-10-20 NOTE — PROGRESS NOTES
Progress Note  Date:10/20/2020       Room:0338/0338-02  Patient Name:Avani Cunningham     YOB: 1950     Age:69 y.o. Subjective    Subjective:  Symptoms:  Stable. She reports malaise, weakness and anxiety. No shortness of breath, cough, chest pain, headache, chest pressure, anorexia or diarrhea. Diet:  Poor intake. She reports  nausea. No vomiting. Activity level: Impaired due to pain. Pain:  She complains of pain that is severe. Pain is requiring pain medication. Review of Systems   Constitutional: Negative for fever. Respiratory: Negative for cough and shortness of breath. Cardiovascular: Negative for chest pain. Gastrointestinal: Positive for nausea. Negative for anorexia, diarrhea and vomiting. Neurological: Positive for weakness. Objective         Vitals Last 24 Hours:  TEMPERATURE:  Temp  Av.9 °F (36.6 °C)  Min: 96.4 °F (35.8 °C)  Max: 98.8 °F (37.1 °C)  RESPIRATIONS RANGE: Resp  Av.7  Min: 14  Max: 26  PULSE OXIMETRY RANGE: SpO2  Av.4 %  Min: 94 %  Max: 99 %  PULSE RANGE: Pulse  Av.2  Min: 62  Max: 85  BLOOD PRESSURE RANGE: Systolic (49TEI), DBN:479 , Min:102 , QXS:390   ; Diastolic (60KGG), YAP:27, Min:62, Max:82    I/O (24Hr): No intake or output data in the 24 hours ending 10/20/20 0927  Objective:  General Appearance:  Comfortable. Vital signs: (most recent): Blood pressure 102/62, pulse 62, temperature 98.4 °F (36.9 °C), temperature source Oral, resp. rate 14, height 5' 10\" (1.778 m), weight 205 lb (93 kg), SpO2 94 %, not currently breastfeeding. Vital signs are normal.  No fever. Output: Producing urine. HEENT: Normal HEENT exam.    Lungs:  Normal effort and normal respiratory rate. There are decreased breath sounds. No wheezes. Heart: Normal rate. Regular rhythm. No murmur. Abdomen: Abdomen is soft and non-distended. Bowel sounds are normal.   There is no abdominal tenderness.   There is no epigastric area or suprapubic area tenderness. There is no rebound tenderness. Labs/Imaging/Diagnostics    Labs:  CBC:  Recent Labs     10/19/20  1802 10/20/20  0402   WBC 13.2* 12.8*   RBC 4.77 4.31   HGB 14.2 12.8   HCT 41.7 37.7   MCV 87.4 87.5   RDW 13.6 13.7    239     CHEMISTRIES:  Recent Labs     10/19/20  1802 10/20/20  0402    139   K 4.0 3.9    107   CO2 25 22   BUN 12 10   CREATININE 0.8 0.8   GLUCOSE 99 117*     PT/INR:No results for input(s): PROTIME, INR in the last 72 hours. APTT:No results for input(s): APTT in the last 72 hours. LIVER PROFILE:  Recent Labs     10/19/20  1802   AST 26   ALT 10   BILITOT 0.7   ALKPHOS 136*       Imaging Last 24 Hours:  Ct Abdomen Pelvis W Iv Contrast Additional Contrast? None    Result Date: 10/19/2020  EXAMINATION: CT OF THE ABDOMEN AND PELVIS WITH CONTRAST 10/19/2020 7:19 pm TECHNIQUE: CT of the abdomen and pelvis was performed with the administration of intravenous contrast. Multiplanar reformatted images are provided for review. Dose modulation, iterative reconstruction, and/or weight based adjustment of the mA/kV was utilized to reduce the radiation dose to as low as reasonably achievable. COMPARISON: None. HISTORY: ORDERING SYSTEM PROVIDED HISTORY: abdominal pain TECHNOLOGIST PROVIDED HISTORY: Reason for exam:->abdominal pain Additional Contrast?->None FINDINGS: Lung Bases: Bilateral compressive atelectasis. Liver: No hepatic lesions identified. Bile ducts:  No evidence of intrahepatic or extrahepatic biliary dilatation. Gallbladder is unremarkable. Pancreas: No pancreatic lesions. The pancreatic duct is not dilated. Adrenal glands: Normal in appearance. Kidneys: No evidence of hydronephrosis. No abnormal renal lesions. Spleen: No enhancing lesions. Bowel: There is pericolonic fat stranding adjacent to the distal descending colon/proximal sigmoid colon.   Large locule of peritoneal free air (measuring approximately 5 cm) adjacent to the inflamed portion of the sigmoid colon. No evidence of abnormal pericolic fluid collection. Multiple additional diverticulosis seen throughout the sigmoid colon. The appendix is normal in appearance. Peritoneum/Retroperitoneum: No abnormal pelvic lymphadenopathy. Pelvis: The bladder is decompressed. Uterus is normal for patient's age. 2 cm pedunculated fibroid again seen in the posterior pelvis. Bones/Soft tissues: No aggressive osseous lesions. Acute diverticulitis of the distal descending colon/proximal sigmoid colon with perforation. No pericolonic abscess identified. Appendix is normal in appearance. Assessment//Plan           Hospital Problems           Last Modified POA    Acute diverticulitis 10/19/2020 Yes        Assessment:  (Active Problems:    Acute diverticulitis  Active Problems:    Palpitations    Acute diverticulitis    Diverticulitis    Perforated diverticulum of large intestine  Resolved Problems:    * No resolved hospital problems. *      ). Plan:   (Iv rocephin  Iv flagyl  Iv ofluids).        Electronically signed by Irena Gabriel MD on 10/20/20 at 9:27 AM EDT

## 2020-10-20 NOTE — PLAN OF CARE
Problem: Pain:  Goal: Pain level will decrease  Description: Pain level will decrease  Outcome: Met This Shift     Problem: Pain:  Goal: Control of acute pain  Description: Control of acute pain  Outcome: Met This Shift     Problem: Pain:  Goal: Control of chronic pain  Description: Control of chronic pain  Outcome: Met This Shift     Problem: Activity:  Goal: Risk for activity intolerance will decrease  Description: Risk for activity intolerance will decrease  Outcome: Met This Shift     Problem: Bowel/Gastric:  Goal: Bowel function will improve  Description: Bowel function will improve  Outcome: Met This Shift     Problem: Bowel/Gastric:  Goal: Diagnostic test results will improve  Description: Diagnostic test results will improve  Outcome: Met This Shift     Problem: Bowel/Gastric:  Goal: Occurrences of nausea will decrease  Description: Occurrences of nausea will decrease  Outcome: Met This Shift     Problem:  Bowel/Gastric:  Goal: Occurrences of vomiting will decrease  Description: Occurrences of vomiting will decrease  Outcome: Met This Shift     Problem: Fluid Volume:  Goal: Maintenance of adequate hydration will improve  Description: Maintenance of adequate hydration will improve  Outcome: Met This Shift     Problem: Health Behavior:  Goal: Ability to state signs and symptoms to report to health care provider will improve  Description: Ability to state signs and symptoms to report to health care provider will improve  Outcome: Met This Shift     Problem: Physical Regulation:  Goal: Complications related to the disease process, condition or treatment will be avoided or minimized  Description: Complications related to the disease process, condition or treatment will be avoided or minimized  Outcome: Met This Shift     Problem: Physical Regulation:  Goal: Ability to maintain clinical measurements within normal limits will improve  Description: Ability to maintain clinical measurements within normal limits will

## 2020-10-20 NOTE — CONSULTS
[address]                                  CONSULTATION    PATIENT NAME: Brandy Alvarez                    :        1950  MED REC NO:   [id number]                            ROOM:       4547  ACCOUNT NO:   [de-identified]                           ADMIT DATE: 10/19/2020  PROVIDER:     Galo Ta MD    CONSULT DATE:  10/20/2020    HISTORY OF PRESENT ILLNESS:  The patient is a 50-year-old white female,  with a past medical history of diverticulitis, _____ cardiac  palpitations, presents to the hospital secondary to the sudden onset of  acute left lower quadrant abdominal pain. The patient states that she  was asymptomatic and feeling good the night prior to being admitted to  the hospital, and symptoms started early on the day of admission. The  patient stated around 9 a.m. started experiencing severe excruciating  left lower quadrant abdominal pain. Pain persisted over the next  several hours, and became extremely severe in nature. The patient was  then taken to the emergency room at [address]  Associated  symptoms include nausea, vomiting, and constipation. The patient had a  CT scan of the abdomen and pelvis in the emergency room which revealed  acute diverticulitis of the descending colon/proximal sigmoid colon with  perforation. There is no pericolonic abscess identified. The appendix  was normal in appearance. The patient also had a urinalysis which was  essentially negative, troponin was less than 0.01, electrolytes were  within normal limits, BUN was 12, creatinine was 0.8, and lactic acid  level was 1.5. White blood cell count was elevated at 13.2, hemoglobin  14, hematocrit is 41.7. PAST MEDICAL HISTORY:  Positive for diverticulosis. The patient had  _____ diverticulitis approximately 20 years ago.   The patient denied any  diabetes, myocardial infarction, cancers, strokes, or congestive heart  failure. PAST SURGICAL HISTORY:  Positive for cholecystectomy, positive  gastrectomy. ALLERGIES:  Include DEMEROL and LEVAQUIN. SOCIAL HISTORY:  Smoking history - none. EtOH - none. Illegal drugs -  the patient denies. HOME MEDICATIONS:  None. FAMILY HISTORY:  Positive for hypertension, positive coronary artery  disease. REVIEW OF SYSTEMS:  Essentially negative except for the present illness. PHYSICAL EXAMINATION:  GENERAL:  Well-developed, well-nourished white female, active and  cooperative, oriented x3, in acute distress secondary to severe left  lower quadrant abdominal pain. HEENT:  Normocephalic and atraumatic. Pupils equal, round, reactive to  light and accommodation. Extraocular muscles are intact. ENT clear. NECK:  Supple - no bruits. COR:  Regular rate and rhythm. LUNGS:  Clear. ABDOMEN:  Soft. Positive left lower quadrant abdominal pain. Positive  rebound left lower quadrant. No spasm, no rigidity. Bowel sounds are  decreased. EXTREMITIES:  No clubbing, no edema, no cyanosis, dorsalis 1+. NEUROLOGIC:  Cranial nerves II through XII intact distally, grossly  intact. ASSESSMENT:  1. Acute diverticulitis of the descending colon/possible sigmoid colon  with perforation. 2.  Atrial flutter. 3.  History of palpitations. 4.  Status post cholecystectomy. 5.  Status post gastrectomy. PLAN:  Admit, IV fluids will be administered, IV Rocephin will be  administered, IV Flagyl will be administered, the patient will be  monitored very closely. Surgical consultation with Dr. Padmini Tijerina has also  been obtained.         Ernst Arzola MD    D: 10/20/2020 1:41:29       T: 10/20/2020 1:47:43     LUIS [id number]  Job#: [id number]     Doc#: [id number]    CC:

## 2020-10-21 LAB
ANION GAP SERPL CALCULATED.3IONS-SCNC: 10 MMOL/L (ref 7–16)
BASOPHILS ABSOLUTE: 0.05 E9/L (ref 0–0.2)
BASOPHILS RELATIVE PERCENT: 0.5 % (ref 0–2)
BUN BLDV-MCNC: 6 MG/DL (ref 8–23)
CALCIUM SERPL-MCNC: 8.3 MG/DL (ref 8.6–10.2)
CHLORIDE BLD-SCNC: 109 MMOL/L (ref 98–107)
CO2: 20 MMOL/L (ref 22–29)
CREAT SERPL-MCNC: 0.7 MG/DL (ref 0.5–1)
EOSINOPHILS ABSOLUTE: 0.1 E9/L (ref 0.05–0.5)
EOSINOPHILS RELATIVE PERCENT: 1 % (ref 0–6)
GFR AFRICAN AMERICAN: >60
GFR NON-AFRICAN AMERICAN: >60 ML/MIN/1.73
GLUCOSE BLD-MCNC: 107 MG/DL (ref 74–99)
HCT VFR BLD CALC: 35.4 % (ref 34–48)
HEMOGLOBIN: 11.9 G/DL (ref 11.5–15.5)
IMMATURE GRANULOCYTES #: 0.05 E9/L
IMMATURE GRANULOCYTES %: 0.5 % (ref 0–5)
LYMPHOCYTES ABSOLUTE: 1.37 E9/L (ref 1.5–4)
LYMPHOCYTES RELATIVE PERCENT: 13 % (ref 20–42)
MCH RBC QN AUTO: 29.5 PG (ref 26–35)
MCHC RBC AUTO-ENTMCNC: 33.6 % (ref 32–34.5)
MCV RBC AUTO: 87.6 FL (ref 80–99.9)
MONOCYTES ABSOLUTE: 0.83 E9/L (ref 0.1–0.95)
MONOCYTES RELATIVE PERCENT: 7.9 % (ref 2–12)
NEUTROPHILS ABSOLUTE: 8.1 E9/L (ref 1.8–7.3)
NEUTROPHILS RELATIVE PERCENT: 77.1 % (ref 43–80)
PDW BLD-RTO: 13.7 FL (ref 11.5–15)
PLATELET # BLD: 212 E9/L (ref 130–450)
PMV BLD AUTO: 9.6 FL (ref 7–12)
POTASSIUM REFLEX MAGNESIUM: 3.6 MMOL/L (ref 3.5–5)
RBC # BLD: 4.04 E12/L (ref 3.5–5.5)
SODIUM BLD-SCNC: 139 MMOL/L (ref 132–146)
WBC # BLD: 10.5 E9/L (ref 4.5–11.5)

## 2020-10-21 PROCEDURE — 85025 COMPLETE CBC W/AUTO DIFF WBC: CPT

## 2020-10-21 PROCEDURE — 2580000003 HC RX 258: Performed by: SURGERY

## 2020-10-21 PROCEDURE — 6370000000 HC RX 637 (ALT 250 FOR IP): Performed by: SURGERY

## 2020-10-21 PROCEDURE — 80048 BASIC METABOLIC PNL TOTAL CA: CPT

## 2020-10-21 PROCEDURE — 6360000002 HC RX W HCPCS: Performed by: SURGERY

## 2020-10-21 PROCEDURE — C9113 INJ PANTOPRAZOLE SODIUM, VIA: HCPCS | Performed by: SURGERY

## 2020-10-21 PROCEDURE — 99232 SBSQ HOSP IP/OBS MODERATE 35: CPT | Performed by: SURGERY

## 2020-10-21 PROCEDURE — 36415 COLL VENOUS BLD VENIPUNCTURE: CPT

## 2020-10-21 PROCEDURE — 1200000000 HC SEMI PRIVATE

## 2020-10-21 PROCEDURE — 2500000003 HC RX 250 WO HCPCS: Performed by: SURGERY

## 2020-10-21 RX ADMIN — METRONIDAZOLE 500 MG: 500 INJECTION, SOLUTION INTRAVENOUS at 12:44

## 2020-10-21 RX ADMIN — OXYCODONE 10 MG: 5 TABLET ORAL at 16:58

## 2020-10-21 RX ADMIN — METRONIDAZOLE 500 MG: 500 INJECTION, SOLUTION INTRAVENOUS at 04:21

## 2020-10-21 RX ADMIN — ENOXAPARIN SODIUM 40 MG: 40 INJECTION SUBCUTANEOUS at 09:38

## 2020-10-21 RX ADMIN — SODIUM CHLORIDE: 9 INJECTION, SOLUTION INTRAVENOUS at 09:36

## 2020-10-21 RX ADMIN — Medication 10 ML: at 19:59

## 2020-10-21 RX ADMIN — OXYCODONE 10 MG: 5 TABLET ORAL at 04:29

## 2020-10-21 RX ADMIN — METRONIDAZOLE 500 MG: 500 INJECTION, SOLUTION INTRAVENOUS at 19:57

## 2020-10-21 RX ADMIN — PANTOPRAZOLE SODIUM 40 MG: 40 INJECTION, POWDER, FOR SOLUTION INTRAVENOUS at 09:37

## 2020-10-21 RX ADMIN — WATER 1 G: 1 INJECTION INTRAMUSCULAR; INTRAVENOUS; SUBCUTANEOUS at 19:57

## 2020-10-21 RX ADMIN — OXYCODONE 5 MG: 5 TABLET ORAL at 09:38

## 2020-10-21 RX ADMIN — SODIUM CHLORIDE, PRESERVATIVE FREE 10 ML: 5 INJECTION INTRAVENOUS at 09:37

## 2020-10-21 ASSESSMENT — PAIN DESCRIPTION - LOCATION
LOCATION: BACK
LOCATION: BACK

## 2020-10-21 ASSESSMENT — PAIN DESCRIPTION - PAIN TYPE
TYPE: ACUTE PAIN
TYPE: ACUTE PAIN

## 2020-10-21 ASSESSMENT — PAIN DESCRIPTION - DESCRIPTORS
DESCRIPTORS: ACHING;DISCOMFORT;SORE
DESCRIPTORS: ACHING;DISCOMFORT

## 2020-10-21 ASSESSMENT — PAIN SCALES - GENERAL
PAINLEVEL_OUTOF10: 3
PAINLEVEL_OUTOF10: 9
PAINLEVEL_OUTOF10: 6
PAINLEVEL_OUTOF10: 7
PAINLEVEL_OUTOF10: 6

## 2020-10-21 ASSESSMENT — PAIN DESCRIPTION - PROGRESSION
CLINICAL_PROGRESSION: NOT CHANGED
CLINICAL_PROGRESSION: NOT CHANGED

## 2020-10-21 ASSESSMENT — PAIN DESCRIPTION - FREQUENCY
FREQUENCY: INTERMITTENT
FREQUENCY: CONTINUOUS

## 2020-10-21 ASSESSMENT — PAIN DESCRIPTION - ORIENTATION
ORIENTATION: LEFT;LOWER
ORIENTATION: LEFT;LOWER

## 2020-10-21 ASSESSMENT — PAIN DESCRIPTION - ONSET
ONSET: GRADUAL
ONSET: ON-GOING

## 2020-10-21 ASSESSMENT — PAIN - FUNCTIONAL ASSESSMENT
PAIN_FUNCTIONAL_ASSESSMENT: PREVENTS OR INTERFERES SOME ACTIVE ACTIVITIES AND ADLS
PAIN_FUNCTIONAL_ASSESSMENT: ACTIVITIES ARE NOT PREVENTED

## 2020-10-21 NOTE — PROGRESS NOTES
GENERAL SURGERY  DAILY PROGRESS NOTE  10/21/2020    Chief Complaint   Patient presents with    Abdominal Pain     LLQ, started this am     Nausea       Subjective:  Feeling much improved today and denies abdominal pain this morning. Tolerating clears     Objective:  /60   Pulse 81   Temp 97.9 °F (36.6 °C) (Oral)   Resp 18   Ht 5' 10\" (1.778 m)   Wt 205 lb (93 kg)   SpO2 95%   Breastfeeding No   BMI 29.41 kg/m²     GENERAL:  Laying in bed, awake, alert, cooperative, no apparent distress  LUNGS:  No increased work of breathing  CARDIOVASCULAR:  RR  ABDOMEN:  Soft, non-tender, non-distended  EXTREMITIES: No edema or swelling  SKIN: Warm and dry    Assessment/Plan:  77 y.o. female admitted with perforated diverticulitis    Advance to low fiber diet  Continue IV abx  Monitor abdominal exam  Possible dc next 24-48hrs pending diet tolerance and abdominal exam    Electronically signed by Gerardo Rey DO on 10/21/2020 at 8:06 AM      General Surgery Progress Note  [facility]    Patient's Name/Date of Birth: Christopher Hannon / 1950    Date: October 21, 2020     Surgeon: Annette Harris MD    Chief Complaint: diverticulitis    Patient Active Problem List   Diagnosis    Palpitations    Acute diverticulitis    Diverticulitis    Perforated diverticulum of large intestine       Subjective: doing well. No pain. Leukocytosis resolved.  Tolerating po    Objective:  /60   Pulse 81   Temp 97.9 °F (36.6 °C) (Oral)   Resp 18   Ht 5' 10\" (1.778 m)   Wt 205 lb (93 kg)   SpO2 95%   Breastfeeding No   BMI 29.41 kg/m²   Labs:  Recent Labs     10/19/20  1802 10/20/20  0402 10/21/20  0538   WBC 13.2* 12.8* 10.5   HGB 14.2 12.8 11.9   HCT 41.7 37.7 35.4     Lab Results   Component Value Date    CREATININE 0.7 10/21/2020    BUN 6 (L) 10/21/2020     10/21/2020    K 3.6 10/21/2020     (H) 10/21/2020    CO2 20 (L) 10/21/2020     Recent Labs     10/19/20  1802   LIPASE 33     CBC with Differential:    Lab Results   Component Value Date    WBC 10.5 10/21/2020    RBC 4.04 10/21/2020    HGB 11.9 10/21/2020    HCT 35.4 10/21/2020     10/21/2020    MCV 87.6 10/21/2020    MCH 29.5 10/21/2020    MCHC 33.6 10/21/2020    RDW 13.7 10/21/2020    LYMPHOPCT 13.0 10/21/2020    MONOPCT 7.9 10/21/2020    BASOPCT 0.5 10/21/2020    MONOSABS 0.83 10/21/2020    LYMPHSABS 1.37 10/21/2020    EOSABS 0.10 10/21/2020    BASOSABS 0.05 10/21/2020     BMP:    Lab Results   Component Value Date     10/21/2020    K 3.6 10/21/2020     10/21/2020    CO2 20 10/21/2020    BUN 6 10/21/2020    LABALBU 4.2 10/19/2020    CREATININE 0.7 10/21/2020    CALCIUM 8.3 10/21/2020    GFRAA >60 10/21/2020    LABGLOM >60 10/21/2020    GLUCOSE 107 10/21/2020    GLUCOSE 109 09/15/2011       General appearance:  NAD  Head: NCAT, PERRLA, EOMI, red conjunctiva  Neck: supple, no masses  Lungs: CTAB, equal chest rise bilateral  Heart: Reg rate  Abdomen: soft, nondistended, nontender  Skin; no lesions  Gu: no cva tenderness  Extremities: extremities normal, atraumatic, no cyanosis or edema      Assessment/Plan:  Navjot Burton is a 77 y.o. female with diverticulitis with perforation    Perforation has sealed.    Advance diet  Continue abx  Home in next 24hours  Will need C scope in 4-6 weeks    Lori Lopez MD  10/21/2020  12:44 PM

## 2020-10-21 NOTE — PLAN OF CARE
Problem: Pain:  Goal: Control of acute pain  Outcome: Met This Shift     Problem: Pain:  Goal: Control of chronic pain  Outcome: Met This Shift     Problem: Activity:  Goal: Risk for activity intolerance will decrease  Outcome: Met This Shift     Problem: Bowel/Gastric:  Goal: Occurrences of nausea will decrease  Outcome: Met This Shift     Problem: Bowel/Gastric:  Goal: Occurrences of vomiting will decrease  Outcome: Met This Shift     Problem: Fluid Volume:  Goal: Maintenance of adequate hydration will improve  Outcome: Met This Shift     Problem: Health Behavior:  Goal: Ability to state signs and symptoms to report to health care provider will improve  Outcome: Met This Shift     Problem: Physical Regulation:  Goal: Ability to maintain clinical measurements within normal limits will improve  Outcome: Met This Shift     Problem: Sensory:  Goal: Ability to identify factors that increase the pain will improve  Outcome: Met This Shift     Problem: Bowel/Gastric:  Goal: Bowel function will improve  Outcome: Ongoing     Problem:  Bowel/Gastric:  Goal: Diagnostic test results will improve  Outcome: Ongoing     Problem: Sensory:  Goal: Pain level will decrease  Outcome: Not Met This Shift

## 2020-10-21 NOTE — PLAN OF CARE
Problem: Pain:  Goal: Pain level will decrease  Description: Pain level will decrease  10/20/2020 1349 by Tushar Serrano RN  Outcome: Met This Shift  10/20/2020 0321 by Pratibha Ley RN  Outcome: Met This Shift  Goal: Control of acute pain  Description: Control of acute pain  10/20/2020 1349 by Tushar Serrano RN  Outcome: Met This Shift  10/20/2020 0321 by Pratibha Ley RN  Outcome: Met This Shift  Goal: Control of chronic pain  Description: Control of chronic pain  10/20/2020 1349 by Tushar Serrano RN  Outcome: Met This Shift  10/20/2020 0321 by Pratibha Ley RN  Outcome: Met This Shift     Problem: Activity:  Goal: Risk for activity intolerance will decrease  Description: Risk for activity intolerance will decrease  10/20/2020 1349 by Tushar Serrano RN  Outcome: Met This Shift  10/20/2020 0321 by Pratibha Ley RN  Outcome: Met This Shift     Problem:  Bowel/Gastric:  Goal: Bowel function will improve  Description: Bowel function will improve  10/20/2020 1349 by Tushar Serrano RN  Outcome: Met This Shift  10/20/2020 0321 by Pratibha Ley RN  Outcome: Met This Shift  Goal: Occurrences of nausea will decrease  Description: Occurrences of nausea will decrease  10/20/2020 1349 by Tushar Serrano RN  Outcome: Met This Shift  10/20/2020 0321 by Pratibha Ley RN  Outcome: Met This Shift  Goal: Occurrences of vomiting will decrease  Description: Occurrences of vomiting will decrease  10/20/2020 1349 by Tushar Serrano RN  Outcome: Met This Shift  10/20/2020 0321 by Pratibha Ley RN  Outcome: Met This Shift     Problem: Health Behavior:  Goal: Ability to state signs and symptoms to report to health care provider will improve  Description: Ability to state signs and symptoms to report to health care provider will improve  10/20/2020 1349 by Tushar Serrano RN  Outcome: Met This Shift  10/20/2020 0321 by Pratibha Ley RN  Outcome: Met This Shift     Problem: Physical Regulation:  Goal: Ability to maintain clinical measurements within normal limits will improve  Description: Ability to maintain clinical measurements within normal limits will improve  10/20/2020 1349 by Earnest Page RN  Outcome: Met This Shift  10/20/2020 0321 by Chuckie Burrows RN  Outcome: Met This Shift     Problem: Sensory:  Goal: Pain level will decrease  Description: Pain level will decrease  10/20/2020 1349 by Earnest Page RN  Outcome: Met This Shift  10/20/2020 0321 by Chuckie Burrows RN  Outcome: Met This Shift  Goal: Ability to identify factors that increase the pain will improve  Description: Ability to identify factors that increase the pain will improve  10/20/2020 1349 by Earnest Page RN  Outcome: Met This Shift  10/20/2020 0321 by Chuckie Burrows RN  Outcome: Met This Shift  Goal: Ability to notify healthcare provider of pain before it becomes unmanageable or unbearable will improve  Description: Ability to notify healthcare provider of pain before it becomes unmanageable or unbearable will improve  10/20/2020 1349 by Earnest Page RN  Outcome: Met This Shift  10/20/2020 0321 by Chuckie Burrows RN  Outcome: Met This Shift     Problem: Falls - Risk of:  Goal: Will remain free from falls  Description: Will remain free from falls  Outcome: Met This Shift  Goal: Absence of physical injury  Description: Absence of physical injury  Outcome: Met This Shift

## 2020-10-22 ENCOUNTER — APPOINTMENT (OUTPATIENT)
Dept: CT IMAGING | Age: 70
DRG: 392 | End: 2020-10-22
Payer: MEDICARE

## 2020-10-22 VITALS
DIASTOLIC BLOOD PRESSURE: 75 MMHG | WEIGHT: 209.38 LBS | OXYGEN SATURATION: 95 % | RESPIRATION RATE: 16 BRPM | SYSTOLIC BLOOD PRESSURE: 136 MMHG | HEIGHT: 70 IN | HEART RATE: 68 BPM | TEMPERATURE: 99 F | BODY MASS INDEX: 29.97 KG/M2

## 2020-10-22 LAB
ANION GAP SERPL CALCULATED.3IONS-SCNC: 11 MMOL/L (ref 7–16)
BASOPHILS ABSOLUTE: 0.05 E9/L (ref 0–0.2)
BASOPHILS RELATIVE PERCENT: 0.6 % (ref 0–2)
BUN BLDV-MCNC: 9 MG/DL (ref 8–23)
CALCIUM SERPL-MCNC: 8.6 MG/DL (ref 8.6–10.2)
CHLORIDE BLD-SCNC: 107 MMOL/L (ref 98–107)
CO2: 21 MMOL/L (ref 22–29)
CREAT SERPL-MCNC: 0.7 MG/DL (ref 0.5–1)
EOSINOPHILS ABSOLUTE: 0.38 E9/L (ref 0.05–0.5)
EOSINOPHILS RELATIVE PERCENT: 4.6 % (ref 0–6)
GFR AFRICAN AMERICAN: >60
GFR NON-AFRICAN AMERICAN: >60 ML/MIN/1.73
GLUCOSE BLD-MCNC: 103 MG/DL (ref 74–99)
HCT VFR BLD CALC: 36.2 % (ref 34–48)
HEMOGLOBIN: 12.3 G/DL (ref 11.5–15.5)
IMMATURE GRANULOCYTES #: 0.03 E9/L
IMMATURE GRANULOCYTES %: 0.4 % (ref 0–5)
LYMPHOCYTES ABSOLUTE: 1.43 E9/L (ref 1.5–4)
LYMPHOCYTES RELATIVE PERCENT: 17.3 % (ref 20–42)
MCH RBC QN AUTO: 29.4 PG (ref 26–35)
MCHC RBC AUTO-ENTMCNC: 34 % (ref 32–34.5)
MCV RBC AUTO: 86.6 FL (ref 80–99.9)
MONOCYTES ABSOLUTE: 0.67 E9/L (ref 0.1–0.95)
MONOCYTES RELATIVE PERCENT: 8.1 % (ref 2–12)
NEUTROPHILS ABSOLUTE: 5.72 E9/L (ref 1.8–7.3)
NEUTROPHILS RELATIVE PERCENT: 69 % (ref 43–80)
PDW BLD-RTO: 13.6 FL (ref 11.5–15)
PLATELET # BLD: 226 E9/L (ref 130–450)
PMV BLD AUTO: 9.5 FL (ref 7–12)
POTASSIUM REFLEX MAGNESIUM: 3.7 MMOL/L (ref 3.5–5)
RBC # BLD: 4.18 E12/L (ref 3.5–5.5)
SODIUM BLD-SCNC: 139 MMOL/L (ref 132–146)
WBC # BLD: 8.3 E9/L (ref 4.5–11.5)

## 2020-10-22 PROCEDURE — 2580000003 HC RX 258: Performed by: SURGERY

## 2020-10-22 PROCEDURE — 74177 CT ABD & PELVIS W/CONTRAST: CPT

## 2020-10-22 PROCEDURE — C9113 INJ PANTOPRAZOLE SODIUM, VIA: HCPCS | Performed by: SURGERY

## 2020-10-22 PROCEDURE — 36415 COLL VENOUS BLD VENIPUNCTURE: CPT

## 2020-10-22 PROCEDURE — 99238 HOSP IP/OBS DSCHRG MGMT 30/<: CPT | Performed by: SURGERY

## 2020-10-22 PROCEDURE — 6360000004 HC RX CONTRAST MEDICATION: Performed by: RADIOLOGY

## 2020-10-22 PROCEDURE — 80048 BASIC METABOLIC PNL TOTAL CA: CPT

## 2020-10-22 PROCEDURE — 6360000002 HC RX W HCPCS: Performed by: SURGERY

## 2020-10-22 PROCEDURE — 2500000003 HC RX 250 WO HCPCS: Performed by: SURGERY

## 2020-10-22 PROCEDURE — 85025 COMPLETE CBC W/AUTO DIFF WBC: CPT

## 2020-10-22 RX ORDER — METRONIDAZOLE 500 MG/1
500 TABLET ORAL 3 TIMES DAILY
Qty: 36 TABLET | Refills: 0 | Status: SHIPPED | OUTPATIENT
Start: 2020-10-22 | End: 2020-11-03

## 2020-10-22 RX ORDER — OXYCODONE HYDROCHLORIDE 5 MG/1
5 TABLET ORAL EVERY 6 HOURS PRN
Qty: 12 TABLET | Refills: 0 | Status: SHIPPED | OUTPATIENT
Start: 2020-10-22 | End: 2020-10-25

## 2020-10-22 RX ORDER — CEFDINIR 300 MG/1
300 CAPSULE ORAL 2 TIMES DAILY
Qty: 24 CAPSULE | Refills: 0 | Status: SHIPPED | OUTPATIENT
Start: 2020-10-22 | End: 2020-11-03

## 2020-10-22 RX ADMIN — METRONIDAZOLE 500 MG: 500 INJECTION, SOLUTION INTRAVENOUS at 04:30

## 2020-10-22 RX ADMIN — PANTOPRAZOLE SODIUM 40 MG: 40 INJECTION, POWDER, FOR SOLUTION INTRAVENOUS at 08:49

## 2020-10-22 RX ADMIN — ENOXAPARIN SODIUM 40 MG: 40 INJECTION SUBCUTANEOUS at 08:49

## 2020-10-22 RX ADMIN — IOHEXOL 50 ML: 240 INJECTION, SOLUTION INTRATHECAL; INTRAVASCULAR; INTRAVENOUS; ORAL at 13:08

## 2020-10-22 RX ADMIN — IOPAMIDOL 75 ML: 755 INJECTION, SOLUTION INTRAVENOUS at 13:08

## 2020-10-22 RX ADMIN — METRONIDAZOLE 500 MG: 500 INJECTION, SOLUTION INTRAVENOUS at 14:04

## 2020-10-22 RX ADMIN — SODIUM CHLORIDE, PRESERVATIVE FREE 10 ML: 5 INJECTION INTRAVENOUS at 08:49

## 2020-10-22 ASSESSMENT — PAIN DESCRIPTION - PROGRESSION: CLINICAL_PROGRESSION: GRADUALLY IMPROVING

## 2020-10-22 ASSESSMENT — ENCOUNTER SYMPTOMS
COUGH: 0
SHORTNESS OF BREATH: 0
NAUSEA: 0
DIARRHEA: 0
VOMITING: 0

## 2020-10-22 ASSESSMENT — PAIN DESCRIPTION - ORIENTATION: ORIENTATION: LEFT;LOWER

## 2020-10-22 ASSESSMENT — PAIN SCALES - GENERAL: PAINLEVEL_OUTOF10: 0

## 2020-10-22 ASSESSMENT — PAIN DESCRIPTION - LOCATION: LOCATION: ABDOMEN;BACK

## 2020-10-22 ASSESSMENT — PAIN DESCRIPTION - PAIN TYPE: TYPE: ACUTE PAIN

## 2020-10-22 NOTE — PROGRESS NOTES
GENERAL SURGERY  DAILY PROGRESS NOTE  10/22/2020    Chief Complaint   Patient presents with    Abdominal Pain     LLQ, started this am     Nausea       Subjective:  Has no abdominal pain, tolerating low fiber diet and wants to go home    Objective:  /75   Pulse 68   Temp 99 °F (37.2 °C) (Oral)   Resp 16   Ht 5' 10\" (1.778 m)   Wt 209 lb 6 oz (95 kg)   SpO2 95%   Breastfeeding No   BMI 30.04 kg/m²     GENERAL:  Laying in bed, awake, alert, cooperative, no apparent distress  LUNGS:  No increased work of breathing  CARDIOVASCULAR:  RR  ABDOMEN:  Soft, non-tender, non-distended  EXTREMITIES: No edema or swelling  SKIN: Warm and dry    Assessment/Plan:  77 y.o. female admitted with perforated diverticulitis    low fiber diet  Continue IV abx  Monitor abdominal exam  Discharge  Colonoscopy in 6 weeks    Electronically signed by Son Perales DO on 10/22/2020 at 7:49 AM

## 2020-10-22 NOTE — PROGRESS NOTES
Progress Note  Date:10/22/2020       Room:0338/0338-02  Patient Name:Avani Cunningham     YOB: 1950     Age:69 y.o. Subjective    Subjective:  Symptoms:  Stable. She reports malaise and anxiety. No shortness of breath, cough, chest pain, weakness, headache, chest pressure, anorexia or diarrhea. Diet:  Adequate intake. No nausea or vomiting. Activity level: Impaired due to weakness. Pain:  She reports no pain. Review of Systems   Constitutional: Negative for fever. Respiratory: Negative for cough and shortness of breath. Cardiovascular: Negative for chest pain. Gastrointestinal: Negative for anorexia, diarrhea, nausea and vomiting. Neurological: Negative for weakness. Objective         Vitals Last 24 Hours:  TEMPERATURE:  Temp  Av.5 °F (36.9 °C)  Min: 98 °F (36.7 °C)  Max: 99 °F (37.2 °C)  RESPIRATIONS RANGE: Resp  Av  Min: 16  Max: 18  PULSE OXIMETRY RANGE: SpO2  Av %  Min: 95 %  Max: 95 %  PULSE RANGE: Pulse  Av  Min: 68  Max: 78  BLOOD PRESSURE RANGE: Systolic (03RPY), UTI:492 , Min:136 , ENS:930   ; Diastolic (04XHP), PLI:06, Min:75, Max:77    I/O (24Hr): Intake/Output Summary (Last 24 hours) at 10/22/2020 0933  Last data filed at 10/21/2020 1905  Gross per 24 hour   Intake 1578 ml   Output 0 ml   Net 1578 ml     Objective:  General Appearance:  Comfortable. Vital signs: (most recent): Blood pressure 136/75, pulse 68, temperature 99 °F (37.2 °C), temperature source Oral, resp. rate 16, height 5' 10\" (1.778 m), weight 209 lb 6 oz (95 kg), SpO2 95 %, not currently breastfeeding. Vital signs are normal.  No fever. Output: Producing urine. HEENT: Normal HEENT exam.    Lungs:  Normal effort and normal respiratory rate. There are decreased breath sounds. No wheezes or rhonchi. Heart: Normal rate. Regular rhythm. No murmur. Abdomen: Abdomen is soft and non-distended. Bowel sounds are normal.   There is generalized tenderness. There is left lower quadrant tenderness. There is no epigastric area or suprapubic area tenderness. There is no rebound tenderness. There is no mass. Extremities: Normal range of motion. There is no deformity, effusion, dependent edema or local swelling. Labs/Imaging/Diagnostics    Labs:  CBC:  Recent Labs     10/20/20  0402 10/21/20  0538 10/22/20  0531   WBC 12.8* 10.5 8.3   RBC 4.31 4.04 4.18   HGB 12.8 11.9 12.3   HCT 37.7 35.4 36.2   MCV 87.5 87.6 86.6   RDW 13.7 13.7 13.6    212 226     CHEMISTRIES:  Recent Labs     10/20/20  0402 10/21/20  0538 10/22/20  0531    139 139   K 3.9 3.6 3.7    109* 107   CO2 22 20* 21*   BUN 10 6* 9   CREATININE 0.8 0.7 0.7   GLUCOSE 117* 107* 103*     PT/INR:No results for input(s): PROTIME, INR in the last 72 hours. APTT:No results for input(s): APTT in the last 72 hours. LIVER PROFILE:  Recent Labs     10/19/20  1802   AST 26   ALT 10   BILITOT 0.7   ALKPHOS 136*       Imaging Last 24 Hours:  No results found. Assessment//Plan           Hospital Problems           Last Modified POA    * (Principal) Perforated diverticulum of large intestine 10/20/2020 Yes    Palpitations 10/20/2020 Yes    Acute diverticulitis 10/19/2020 Yes    Diverticulitis 10/20/2020 Yes        Assessment:  (Principal Problem:    Perforated diverticulum of large intestine  Active Problems:    Palpitations    Acute diverticulitis    Diverticulitis  Resolved Problems:    * No resolved hospital problems. *   ). Plan:   (Ct abd   Iv rocephin  Iv flagyl).        Electronically signed by Vikas Banks MD on 10/22/20 at 9:33 AM EDT

## 2020-10-22 NOTE — DISCHARGE SUMMARY
Physician Discharge Summary     Patient ID:  Nahid Youngblood  [id number]  17 y.o.  1950    Admit date: 10/19/2020    Discharge date and time: 10/22/2020    Admitting Physician: Memo Ramirez MD     Admission Diagnoses:   Patient Active Problem List   Diagnosis    Palpitations    Acute diverticulitis    Diverticulitis    Perforated diverticulum of large intestine       Discharge Diagnoses:   Patient Active Problem List   Diagnosis    Palpitations    Acute diverticulitis    Diverticulitis    Perforated diverticulum of large intestine       Admission Condition: fair    Discharged Condition: good    Indication for Admission: Perforated diverticulitis    Hospital Course: Nahid Youngblood is a 77 y.o. female who presented with left lower quadrant abdominal pain and CT evidence of perforated diverticulitis. She had mild leukocytosis which has resolved. She was admitted and started on IV antibiotics. Her pain drastically improved and her diet was advanced. She is doing well today and having normal GI function. She is ready for discharge. She will follow-up and 4 to 6 weeks for colonoscopy. Consults: IM    Significant Diagnostic Studies: as above. Treatments: as above.      In process/preliminary results:  Outstanding Order Results     Date and Time Order Name Status Description    10/20/2020 0403 Culture, Blood 2 Preliminary     10/20/2020 0350 Culture, Blood 1 Preliminary           Patient Instructions:   Current Discharge Medication List      STOP taking these medications       citalopram (CELEXA) 20 MG tablet Comments:   Reason for Stopping:         amoxicillin (AMOXIL) 500 MG capsule Comments:   Reason for Stopping:               Discharge Exam:  General appearance: AAOx3, NAD  Head: NCAT, PERRLA, EOMI, red conjunctiva  Neck: supple, no masses  Lungs: Equal chest rise bilateral  Heart: Reg rate  Abdomen: soft, nondistended, tender appropriately, normotympanic, no guarding, no peritoneal signs,

## 2020-10-22 NOTE — PROGRESS NOTES
CLINICAL PHARMACY NOTE: MEDS TO [address] Select Patient?: No  Total # of Prescriptions Filled: 3   The following medications were delivered to the patient:  · OXYCODONE HCL 5 MG   · CEFDINIR 300 MG   · FLAGYL 500 MG   Total # of Interventions Completed: 3  Time Spent (min): 30    Additional Documentation:

## 2020-10-22 NOTE — PROGRESS NOTES
Progress Note  Date:10/22/2020       Room:0338/0338-02  Patient Name:Avani Cunningham     YOB: 1950     Age:69 y.o. Subjective    Subjective:  Symptoms:  Stable. She reports malaise, weakness and anxiety. No shortness of breath, cough, chest pain, headache, chest pressure, anorexia or diarrhea. Diet:  Adequate intake. No nausea or vomiting. Activity level: Impaired due to weakness. Pain:  She reports no pain. Review of Systems   Constitutional: Negative for fever. Respiratory: Negative for cough and shortness of breath. Cardiovascular: Negative for chest pain. Gastrointestinal: Negative for anorexia, diarrhea, nausea and vomiting. Neurological: Positive for weakness. Objective         Vitals Last 24 Hours:  TEMPERATURE:  Temp  Av °F (36.7 °C)  Min: 97.9 °F (36.6 °C)  Max: 98 °F (36.7 °C)  RESPIRATIONS RANGE: Resp  Av  Min: 18  Max: 18  PULSE OXIMETRY RANGE: SpO2  Av %  Min: 95 %  Max: 95 %  PULSE RANGE: Pulse  Av.5  Min: 78  Max: 81  BLOOD PRESSURE RANGE: Systolic (07MMS), MTV:874 , Min:124 , PVF:965   ; Diastolic (42WCC), MMX:70, Min:60, Max:77    I/O (24Hr): Intake/Output Summary (Last 24 hours) at 10/22/2020 0051  Last data filed at 10/21/2020 1905  Gross per 24 hour   Intake 1578 ml   Output 0 ml   Net 1578 ml     Objective:  General Appearance:  Comfortable. Vital signs: (most recent): Blood pressure 136/77, pulse 78, temperature 98 °F (36.7 °C), temperature source Oral, resp. rate 18, height 5' 10\" (1.778 m), weight 205 lb (93 kg), SpO2 95 %, not currently breastfeeding. Vital signs are normal.  No fever. Output: Producing urine. HEENT: Normal HEENT exam.    Lungs:  Normal effort and normal respiratory rate. There are decreased breath sounds. No wheezes or rhonchi. Heart: Normal rate. Regular rhythm. No murmur. Abdomen: Abdomen is soft and non-distended. Bowel sounds are normal.   There is generalized tenderness. There is left lower quadrant tenderness. There is no epigastric area or suprapubic area tenderness. There is no rebound tenderness. There is no guarding. There is no mass. There is no splenomegaly. Extremities: There is no deformity, effusion, dependent edema or local swelling. Labs/Imaging/Diagnostics    Labs:  CBC:  Recent Labs     10/19/20  1802 10/20/20  0402 10/21/20  0538   WBC 13.2* 12.8* 10.5   RBC 4.77 4.31 4.04   HGB 14.2 12.8 11.9   HCT 41.7 37.7 35.4   MCV 87.4 87.5 87.6   RDW 13.6 13.7 13.7    239 212     CHEMISTRIES:  Recent Labs     10/19/20  1802 10/20/20  0402 10/21/20  0538    139 139   K 4.0 3.9 3.6    107 109*   CO2 25 22 20*   BUN 12 10 6*   CREATININE 0.8 0.8 0.7   GLUCOSE 99 117* 107*     PT/INR:No results for input(s): PROTIME, INR in the last 72 hours. APTT:No results for input(s): APTT in the last 72 hours. LIVER PROFILE:  Recent Labs     10/19/20  1802   AST 26   ALT 10   BILITOT 0.7   ALKPHOS 136*       Imaging Last 24 Hours:  No results found. Assessment//Plan           Hospital Problems           Last Modified POA    * (Principal) Perforated diverticulum of large intestine 10/20/2020 Yes    Palpitations 10/20/2020 Yes    Acute diverticulitis 10/19/2020 Yes    Diverticulitis 10/20/2020 Yes        Assessment:  (Principal Problem:    Perforated diverticulum of large intestine  Active Problems:    Palpitations    Acute diverticulitis    Diverticulitis  Resolved Problems:    * No resolved hospital problems. *   ). Plan:   (Iv rocephin  Iv flagyl  Iv fluids).        Electronically signed by Christen Dutta MD on 10/22/20 at 12:51 AM EDT

## 2020-10-22 NOTE — PROGRESS NOTES
Physician Progress Note      Monico Keenan  CSN #:                  [id number]  :                       1950  ADMIT DATE:       10/19/2020 5:11 PM  [address] [place] DATE:  RESPONDING  PROVIDER #:        Ciera Amado DO          QUERY TEXT:    Dear Dr. Tiara Royal,    Patient admitted with Acute diverticulitis of the descending colon/possible   sigmoid colon  with perforation. Noted documentation of atrial flutter in consultation note   on 10/20/2020. If possible, please document in progress notes and discharge   summary:    The medical record reflects the following:  Risk Factors: diverticulosis  Clinical Indicators: EKG in ED: sinus 71  Treatment: surgical non monitored floor    Thank You,  Jessica Dumas RN, BSN, CDS  Clinical Documentation Improvement Specialist  [phone]  Options provided:  -- Atrial flutter ruled in  -- Atrial flutter was ruled out  -- Other - I will add my own diagnosis  -- Disagree - Not applicable / Not valid  -- Disagree - Clinically unable to determine / Unknown  -- Refer to Clinical Documentation Reviewer    PROVIDER RESPONSE TEXT:    Atrial flutter is ruled in.     Query created by: Ulises Brown on 10/21/2020 3:28 PM      Electronically signed by:  Ciera Amado DO 10/22/2020 7:52 AM

## 2020-10-25 LAB
BLOOD CULTURE, ROUTINE: NORMAL
CULTURE, BLOOD 2: NORMAL

## 2020-11-07 ENCOUNTER — HOSPITAL ENCOUNTER (OUTPATIENT)
Dept: CT IMAGING | Age: 70
Discharge: HOME | End: 2020-11-07
Payer: MEDICARE

## 2020-11-07 PROCEDURE — 74177 CT ABD & PELVIS W/CONTRAST: CPT

## 2020-11-07 PROCEDURE — 6360000004 HC RX CONTRAST MEDICATION: Performed by: RADIOLOGY

## 2020-11-07 RX ADMIN — IOPAMIDOL 75 ML: 755 INJECTION, SOLUTION INTRAVENOUS at 09:56

## 2021-03-18 ENCOUNTER — APPOINTMENT (OUTPATIENT)
Dept: GENERAL RADIOLOGY | Age: 71
DRG: 390 | End: 2021-03-18
Payer: MEDICARE

## 2021-03-18 ENCOUNTER — APPOINTMENT (OUTPATIENT)
Dept: CT IMAGING | Age: 71
DRG: 390 | End: 2021-03-18
Payer: MEDICARE

## 2021-03-18 ENCOUNTER — HOSPITAL ENCOUNTER (INPATIENT)
Age: 71
LOS: 2 days | Discharge: HOME | DRG: 390 | End: 2021-03-20
Attending: EMERGENCY MEDICINE | Admitting: SURGERY
Payer: MEDICARE

## 2021-03-18 DIAGNOSIS — K56.609 SMALL BOWEL OBSTRUCTION (HCC): Primary | ICD-10-CM

## 2021-03-18 LAB
ALBUMIN SERPL-MCNC: 4.4 G/DL (ref 3.5–5.2)
ALP BLD-CCNC: 144 U/L (ref 35–104)
ALT SERPL-CCNC: 10 U/L (ref 0–32)
ANION GAP SERPL CALCULATED.3IONS-SCNC: 10 MMOL/L (ref 7–16)
AST SERPL-CCNC: 22 U/L (ref 0–31)
BASOPHILS ABSOLUTE: 0.06 E9/L (ref 0–0.2)
BASOPHILS RELATIVE PERCENT: 0.6 % (ref 0–2)
BILIRUB SERPL-MCNC: 0.5 MG/DL (ref 0–1.2)
BUN BLDV-MCNC: 10 MG/DL (ref 8–23)
CALCIUM SERPL-MCNC: 9.6 MG/DL (ref 8.6–10.2)
CHLORIDE BLD-SCNC: 105 MMOL/L (ref 98–107)
CO2: 26 MMOL/L (ref 22–29)
CREAT SERPL-MCNC: 0.8 MG/DL (ref 0.5–1)
EKG ATRIAL RATE: 58 BPM
EKG P AXIS: 59 DEGREES
EKG P-R INTERVAL: 160 MS
EKG Q-T INTERVAL: 414 MS
EKG QRS DURATION: 80 MS
EKG QTC CALCULATION (BAZETT): 406 MS
EKG R AXIS: 56 DEGREES
EKG T AXIS: 94 DEGREES
EKG VENTRICULAR RATE: 58 BPM
EOSINOPHILS ABSOLUTE: 0.24 E9/L (ref 0.05–0.5)
EOSINOPHILS RELATIVE PERCENT: 2.6 % (ref 0–6)
GFR AFRICAN AMERICAN: >60
GFR NON-AFRICAN AMERICAN: >60 ML/MIN/1.73
GLUCOSE BLD-MCNC: 120 MG/DL (ref 74–99)
HCT VFR BLD CALC: 45.7 % (ref 34–48)
HEMOGLOBIN: 15 G/DL (ref 11.5–15.5)
IMMATURE GRANULOCYTES #: 0.07 E9/L
IMMATURE GRANULOCYTES %: 0.8 % (ref 0–5)
LIPASE: 31 U/L (ref 13–60)
LYMPHOCYTES ABSOLUTE: 1.3 E9/L (ref 1.5–4)
LYMPHOCYTES RELATIVE PERCENT: 13.9 % (ref 20–42)
MCH RBC QN AUTO: 29.2 PG (ref 26–35)
MCHC RBC AUTO-ENTMCNC: 32.8 % (ref 32–34.5)
MCV RBC AUTO: 88.9 FL (ref 80–99.9)
MONOCYTES ABSOLUTE: 0.7 E9/L (ref 0.1–0.95)
MONOCYTES RELATIVE PERCENT: 7.5 % (ref 2–12)
NEUTROPHILS ABSOLUTE: 6.96 E9/L (ref 1.8–7.3)
NEUTROPHILS RELATIVE PERCENT: 74.6 % (ref 43–80)
PDW BLD-RTO: 13.5 FL (ref 11.5–15)
PLATELET # BLD: 293 E9/L (ref 130–450)
PMV BLD AUTO: 9.4 FL (ref 7–12)
POTASSIUM SERPL-SCNC: 4 MMOL/L (ref 3.5–5)
RBC # BLD: 5.14 E12/L (ref 3.5–5.5)
SODIUM BLD-SCNC: 141 MMOL/L (ref 132–146)
TOTAL PROTEIN: 6.3 G/DL (ref 6.4–8.3)
TROPONIN: <0.01 NG/ML (ref 0–0.03)
WBC # BLD: 9.3 E9/L (ref 4.5–11.5)

## 2021-03-18 PROCEDURE — 80053 COMPREHEN METABOLIC PANEL: CPT

## 2021-03-18 PROCEDURE — 6360000002 HC RX W HCPCS: Performed by: SURGERY

## 2021-03-18 PROCEDURE — 6370000000 HC RX 637 (ALT 250 FOR IP): Performed by: EMERGENCY MEDICINE

## 2021-03-18 PROCEDURE — 83690 ASSAY OF LIPASE: CPT

## 2021-03-18 PROCEDURE — 6360000004 HC RX CONTRAST MEDICATION: Performed by: RADIOLOGY

## 2021-03-18 PROCEDURE — 2580000003 HC RX 258: Performed by: SURGERY

## 2021-03-18 PROCEDURE — 84484 ASSAY OF TROPONIN QUANT: CPT

## 2021-03-18 PROCEDURE — 96375 TX/PRO/DX INJ NEW DRUG ADDON: CPT

## 2021-03-18 PROCEDURE — 74022 RADEX COMPL AQT ABD SERIES: CPT

## 2021-03-18 PROCEDURE — 99285 EMERGENCY DEPT VISIT HI MDM: CPT

## 2021-03-18 PROCEDURE — 85025 COMPLETE CBC W/AUTO DIFF WBC: CPT

## 2021-03-18 PROCEDURE — 6360000002 HC RX W HCPCS: Performed by: EMERGENCY MEDICINE

## 2021-03-18 PROCEDURE — 1200000000 HC SEMI PRIVATE

## 2021-03-18 PROCEDURE — 93005 ELECTROCARDIOGRAM TRACING: CPT | Performed by: EMERGENCY MEDICINE

## 2021-03-18 PROCEDURE — 74177 CT ABD & PELVIS W/CONTRAST: CPT

## 2021-03-18 PROCEDURE — 96374 THER/PROPH/DIAG INJ IV PUSH: CPT

## 2021-03-18 RX ORDER — ONDANSETRON 4 MG/1
4 TABLET, ORALLY DISINTEGRATING ORAL EVERY 8 HOURS PRN
Status: DISCONTINUED | OUTPATIENT
Start: 2021-03-18 | End: 2021-03-20 | Stop reason: HOSPADM

## 2021-03-18 RX ORDER — KETOROLAC TROMETHAMINE 30 MG/ML
30 INJECTION, SOLUTION INTRAMUSCULAR; INTRAVENOUS ONCE
Status: COMPLETED | OUTPATIENT
Start: 2021-03-18 | End: 2021-03-18

## 2021-03-18 RX ORDER — SODIUM CHLORIDE 0.9 % (FLUSH) 0.9 %
10 SYRINGE (ML) INJECTION PRN
Status: DISCONTINUED | OUTPATIENT
Start: 2021-03-18 | End: 2021-03-20 | Stop reason: HOSPADM

## 2021-03-18 RX ORDER — SODIUM CHLORIDE 0.9 % (FLUSH) 0.9 %
10 SYRINGE (ML) INJECTION EVERY 12 HOURS SCHEDULED
Status: DISCONTINUED | OUTPATIENT
Start: 2021-03-18 | End: 2021-03-20 | Stop reason: HOSPADM

## 2021-03-18 RX ORDER — DIPHENHYDRAMINE HYDROCHLORIDE 50 MG/ML
25 INJECTION INTRAMUSCULAR; INTRAVENOUS ONCE
Status: COMPLETED | OUTPATIENT
Start: 2021-03-18 | End: 2021-03-18

## 2021-03-18 RX ORDER — METOCLOPRAMIDE HYDROCHLORIDE 5 MG/ML
10 INJECTION INTRAMUSCULAR; INTRAVENOUS ONCE
Status: COMPLETED | OUTPATIENT
Start: 2021-03-18 | End: 2021-03-18

## 2021-03-18 RX ORDER — ONDANSETRON 2 MG/ML
4 INJECTION INTRAMUSCULAR; INTRAVENOUS EVERY 6 HOURS PRN
Status: DISCONTINUED | OUTPATIENT
Start: 2021-03-18 | End: 2021-03-20 | Stop reason: HOSPADM

## 2021-03-18 RX ORDER — MORPHINE SULFATE 5 MG/ML
5 INJECTION, SOLUTION INTRAMUSCULAR; INTRAVENOUS ONCE
Status: COMPLETED | OUTPATIENT
Start: 2021-03-18 | End: 2021-03-18

## 2021-03-18 RX ORDER — MORPHINE SULFATE 2 MG/ML
2 INJECTION, SOLUTION INTRAMUSCULAR; INTRAVENOUS
Status: DISCONTINUED | OUTPATIENT
Start: 2021-03-18 | End: 2021-03-20 | Stop reason: HOSPADM

## 2021-03-18 RX ORDER — SODIUM CHLORIDE 9 MG/ML
INJECTION, SOLUTION INTRAVENOUS CONTINUOUS
Status: DISCONTINUED | OUTPATIENT
Start: 2021-03-18 | End: 2021-03-20 | Stop reason: HOSPADM

## 2021-03-18 RX ADMIN — MORPHINE SULFATE 5 MG: 5 INJECTION, SOLUTION INTRAMUSCULAR; INTRAVENOUS at 13:05

## 2021-03-18 RX ADMIN — DIPHENHYDRAMINE HYDROCHLORIDE 25 MG: 50 INJECTION, SOLUTION INTRAMUSCULAR; INTRAVENOUS at 11:02

## 2021-03-18 RX ADMIN — KETOROLAC TROMETHAMINE 30 MG: 30 INJECTION, SOLUTION INTRAMUSCULAR; INTRAVENOUS at 09:38

## 2021-03-18 RX ADMIN — LIDOCAINE HYDROCHLORIDE: 20 SOLUTION ORAL; TOPICAL at 09:38

## 2021-03-18 RX ADMIN — IOPAMIDOL 75 ML: 755 INJECTION, SOLUTION INTRAVENOUS at 12:03

## 2021-03-18 RX ADMIN — METOCLOPRAMIDE HYDROCHLORIDE 10 MG: 5 INJECTION INTRAMUSCULAR; INTRAVENOUS at 11:02

## 2021-03-18 RX ADMIN — ENOXAPARIN SODIUM 40 MG: 40 INJECTION SUBCUTANEOUS at 16:05

## 2021-03-18 RX ADMIN — SODIUM CHLORIDE: 9 INJECTION, SOLUTION INTRAVENOUS at 15:54

## 2021-03-18 RX ADMIN — SODIUM CHLORIDE: 9 INJECTION, SOLUTION INTRAVENOUS at 23:47

## 2021-03-18 ASSESSMENT — ENCOUNTER SYMPTOMS
COUGH: 0
SORE THROAT: 0
SHORTNESS OF BREATH: 0
BACK PAIN: 0
NAUSEA: 0
DIARRHEA: 0
WHEEZING: 0
ABDOMINAL DISTENTION: 0
ABDOMINAL PAIN: 1
SINUS PRESSURE: 0
VOMITING: 0
CHEST TIGHTNESS: 0
CONSTIPATION: 1

## 2021-03-18 ASSESSMENT — PAIN DESCRIPTION - DESCRIPTORS: DESCRIPTORS: BURNING

## 2021-03-18 ASSESSMENT — PAIN SCALES - GENERAL
PAINLEVEL_OUTOF10: 5
PAINLEVEL_OUTOF10: 7

## 2021-03-18 ASSESSMENT — PAIN DESCRIPTION - ORIENTATION: ORIENTATION: RIGHT

## 2021-03-18 NOTE — ED PROVIDER NOTES
Chief complaint:  Abdominal burning    HPI history provided by the patient  Patient comes in stating she was constipated yesterday so she took 2 laxatives. She had several large bowel movements after that but since then has had a burning sensation across the general upper abdominal region. No chest pain or palpitations or shortness of breath. No lower abdominal pain. No vomiting or diarrhea. No flank pain or hematuria or dysuria. No migration or radiation of the pain. Describes as more of a burning sensation. Nothing seems to make it better or worse. No specific treatment prior to arrival today. Review of Systems   Constitutional: Negative for chills, diaphoresis, fatigue and fever. HENT: Negative for congestion, sinus pressure and sore throat. Respiratory: Negative for cough, chest tightness, shortness of breath and wheezing. Cardiovascular: Negative for chest pain, palpitations and leg swelling. Gastrointestinal: Positive for abdominal pain and constipation. Negative for abdominal distention, diarrhea, nausea and vomiting. Genitourinary: Negative for dysuria, flank pain, frequency and urgency. Musculoskeletal: Negative for arthralgias, back pain, gait problem, joint swelling, myalgias, neck pain and neck stiffness. Skin: Negative for rash and wound. Neurological: Negative for dizziness, seizures, syncope, weakness, light-headedness, numbness and headaches. Hematological: Negative for adenopathy. All other systems reviewed and are negative. Physical Exam  Vitals signs and nursing note reviewed. Constitutional:       General: She is not in acute distress. Appearance: She is well-developed. She is not ill-appearing, toxic-appearing or diaphoretic. HENT:      Head: Normocephalic and atraumatic. Eyes:      General: No scleral icterus. Pupils: Pupils are equal, round, and reactive to light. Neck:      Musculoskeletal: Normal range of motion and neck supple. Cardiovascular:      Rate and Rhythm: Normal rate and regular rhythm. Heart sounds: Normal heart sounds. No murmur. Pulmonary:      Effort: Pulmonary effort is normal. No respiratory distress. Breath sounds: Normal breath sounds. No stridor, decreased air movement or transmitted upper airway sounds. No decreased breath sounds, wheezing, rhonchi or rales. Abdominal:      General: Bowel sounds are normal.      Palpations: Abdomen is soft. There is no pulsatile mass. Tenderness: There is abdominal tenderness in the epigastric area. There is no right CVA tenderness, left CVA tenderness, guarding or rebound. Comments: Abdomen soft with very mild midepigastric discomfort on palpation with no guarding, rebound tenderness or rigidity. No right upper or left upper quadrant tenderness. The rest of the abdomen is nontender. No pulsatile mass   Musculoskeletal:         General: No swelling, tenderness, deformity or signs of injury. Right lower leg: No edema. Left lower leg: No edema. Skin:     General: Skin is warm and dry. Coloration: Skin is not jaundiced or pale. Findings: No erythema or rash. Neurological:      General: No focal deficit present. Mental Status: She is alert and oriented to person, place, and time. GCS: GCS eye subscore is 4. GCS verbal subscore is 5. GCS motor subscore is 6. Cranial Nerves: No cranial nerve deficit. Coordination: Coordination normal.          Procedures     MDM     ED Course as of Mar 18 1309   Thu Mar 18, 2021   1237 Patient laying the bed in no distress states she is getting some epigastric cramping still with some pain. [NC]   1117 Case discussed with Dr. Darek Wells, detailed overview given, he will admit the patient.     [NC]      ED Course User Index  [NC] 1[facility], DO          EKG Interpretation    Interpreted by emergency department physician    Rhythm: sinus bradycardia  Rate: 58  Axis: normal  Ectopy: none  Conduction: normal  ST Segments: no acute change  T Waves: no acute change  Q Waves: none    Clinical Impression: no acute changes    Hansa Prasad     ED Course as of Mar 18 1309   Thu Mar 18, 2021   1237 Patient laying the bed in no distress states she is getting some epigastric cramping still with some pain. [NC]   4593 Case discussed with Dr. Larry Evangelista, detailed overview given, he will admit the patient. [NC]      ED Course User Index  [NC] Hansa Prasad, DO       --------------------------------------------- PAST HISTORY ---------------------------------------------  Past Medical History:  has a past medical history of Diverticulitis, H/O cardiovascular stress test, and Pneumonia. Past Surgical History:  has a past surgical history that includes gastrectomy and Cholecystectomy. Social History:  reports that she has never smoked. She has never used smokeless tobacco. She reports that she does not drink alcohol or use drugs. Family History: family history includes Other in her father and mother. The patients home medications have been reviewed.     Allergies: Demerol and Levofloxacin    -------------------------------------------------- RESULTS -------------------------------------------------    LABS:  Results for orders placed or performed during the hospital encounter of 03/18/21   Comprehensive Metabolic Panel   Result Value Ref Range    Sodium 141 132 - 146 mmol/L    Potassium 4.0 3.5 - 5.0 mmol/L    Chloride 105 98 - 107 mmol/L    CO2 26 22 - 29 mmol/L    Anion Gap 10 7 - 16 mmol/L    Glucose 120 (H) 74 - 99 mg/dL    BUN 10 8 - 23 mg/dL    CREATININE 0.8 0.5 - 1.0 mg/dL    GFR Non-African American >60 >=60 mL/min/1.73    GFR African American >60     Calcium 9.6 8.6 - 10.2 mg/dL    Total Protein 6.3 (L) 6.4 - 8.3 g/dL    Albumin 4.4 3.5 - 5.2 g/dL    Total Bilirubin 0.5 0.0 - 1.2 mg/dL    Alkaline Phosphatase 144 (H) 35 - 104 U/L    ALT 10 0 - 32 U/L    AST 22 0 - 31 U/L CBC Auto Differential   Result Value Ref Range    WBC 9.3 4.5 - 11.5 E9/L    RBC 5.14 3.50 - 5.50 E12/L    Hemoglobin 15.0 11.5 - 15.5 g/dL    Hematocrit 45.7 34.0 - 48.0 %    MCV 88.9 80.0 - 99.9 fL    MCH 29.2 26.0 - 35.0 pg    MCHC 32.8 32.0 - 34.5 %    RDW 13.5 11.5 - 15.0 fL    Platelets 019 901 - 019 E9/L    MPV 9.4 7.0 - 12.0 fL    Neutrophils % 74.6 43.0 - 80.0 %    Immature Granulocytes % 0.8 0.0 - 5.0 %    Lymphocytes % 13.9 (L) 20.0 - 42.0 %    Monocytes % 7.5 2.0 - 12.0 %    Eosinophils % 2.6 0.0 - 6.0 %    Basophils % 0.6 0.0 - 2.0 %    Neutrophils Absolute 6.96 1.80 - 7.30 E9/L    Immature Granulocytes # 0.07 E9/L    Lymphocytes Absolute 1.30 (L) 1.50 - 4.00 E9/L    Monocytes Absolute 0.70 0.10 - 0.95 E9/L    Eosinophils Absolute 0.24 0.05 - 0.50 E9/L    Basophils Absolute 0.06 0.00 - 0.20 E9/L   Lipase   Result Value Ref Range    Lipase 31 13 - 60 U/L   Troponin   Result Value Ref Range    Troponin <0.01 0.00 - 0.03 ng/mL   EKG 12 Lead   Result Value Ref Range    Ventricular Rate 58 BPM    Atrial Rate 58 BPM    P-R Interval 160 ms    QRS Duration 80 ms    Q-T Interval 414 ms    QTc Calculation (Bazett) 406 ms    P Axis 59 degrees    R Axis 56 degrees    T Axis 94 degrees       RADIOLOGY:  CT ABDOMEN PELVIS W IV CONTRAST Additional Contrast? None   Final Result   1. Multiple distended loops of small bowel in mid abdomen and left abdomen   suggestive of early or partial small bowel obstruction. 2.  Thickened wall of loops of small bowel in the left lower quadrant as seen   on axial image number 92 with subtle adjacent inflammatory changes suggestive   of infectious or inflammatory enteritis. Follow-up recommended to exclude   possibility of neoplasm. 3.  Trace free fluid in the pelvis. 4.  Diverticulosis. 5.  Stable mass along the right aspect of the uterus likely represents a   fibroid. Ultrasound follow-up may be helpful for further evaluation.          XR ACUTE ABD SERIES CHEST 1 VW   Final Result   No acute process                 ------------------------- NURSING NOTES AND VITALS REVIEWED ---------------------------  Date / Time Roomed:  3/18/2021  8:51 AM  ED Bed Assignment:  12/12    The nursing notes within the ED encounter and vital signs as below have been reviewed. Patient Vitals for the past 24 hrs:   BP Temp Pulse Resp SpO2   03/18/21 1306 121/66  61 20 93 %   03/18/21 1117 139/85  77 20 95 %   03/18/21 0957 (!) 145/83  62 16 98 %   03/18/21 0850 (!) 155/93 98.6 °F (37 °C)      03/18/21 0842   81 20 96 %       Oxygen Saturation Interpretation: Normal    ------------------------------------------ PROGRESS NOTES ------------------------------------------  Re-evaluation(s):      Counseling:  I have spoken with the patient and discussed todays results, in addition to providing specific details for the plan of care and counseling regarding the diagnosis and prognosis. Their questions are answered at this time and they are agreeable with the plan of admission.    --------------------------------- ADDITIONAL PROVIDER NOTES ---------------------------------  Consultations: This patient's ED course included: a personal history and physicial examination, re-evaluation prior to disposition, multiple bedside re-evaluations and IV medications    This patient has remained hemodynamically stable during their ED course. Diagnosis:  1. Small bowel obstruction (HCC)        Disposition:  Patient's disposition: Admit to med/surg floor  Patient's condition is stable.            1150 [facility], DO  03/18/21 1310

## 2021-03-18 NOTE — ED NOTES
The patient had a 500 ml emesis of bile colored fluid while I was medicating her.      Isrrael Alfaro RN  03/18/21 1153

## 2021-03-18 NOTE — PLAN OF CARE
Problem: Nutritional:  Goal: Ability to follow a diet with enough fiber (20 to 30 grams) for normal bowel function will improve  Description: Ability to follow a diet with enough fiber (20 to 30 grams) for normal bowel function will improve  Outcome: Met This Shift     Problem: Skin Integrity:  Goal: Risk for impaired skin integrity will decrease  Description: Risk for impaired skin integrity will decrease  Outcome: Met This Shift     Problem:  Bowel/Gastric:  Goal: Control of bowel function will improve  Description: Control of bowel function will improve  Outcome: Not Met This Shift  Goal: Ability to achieve a regular elimination pattern will improve  Description: Ability to achieve a regular elimination pattern will improve  Outcome: Not Met This Shift

## 2021-03-18 NOTE — CARE COORDINATION
SS Note/Discharge plan:  Met with pt and her , Fredis Mariano for transition of care planning, pt functioned independently prior to admission, d/c plan for pt to return home when medically discharged, her  will help her as needed and provide her transportation at discharge, no home care needs anticipated at this time. Sw/cm to follow through discharge.  Electronically signed by RANDALL Cheng on 3/18/2021 at 4:14 PM

## 2021-03-19 ENCOUNTER — APPOINTMENT (OUTPATIENT)
Dept: CT IMAGING | Age: 71
DRG: 390 | End: 2021-03-19
Payer: MEDICARE

## 2021-03-19 LAB
ANION GAP SERPL CALCULATED.3IONS-SCNC: 9 MMOL/L (ref 7–16)
BUN BLDV-MCNC: 13 MG/DL (ref 8–23)
CALCIUM SERPL-MCNC: 8.5 MG/DL (ref 8.6–10.2)
CHLORIDE BLD-SCNC: 113 MMOL/L (ref 98–107)
CO2: 24 MMOL/L (ref 22–29)
CREAT SERPL-MCNC: 0.8 MG/DL (ref 0.5–1)
GFR AFRICAN AMERICAN: >60
GFR NON-AFRICAN AMERICAN: >60 ML/MIN/1.73
GLUCOSE BLD-MCNC: 99 MG/DL (ref 74–99)
HCT VFR BLD CALC: 40.5 % (ref 34–48)
HEMOGLOBIN: 12.9 G/DL (ref 11.5–15.5)
MCH RBC QN AUTO: 29.3 PG (ref 26–35)
MCHC RBC AUTO-ENTMCNC: 31.9 % (ref 32–34.5)
MCV RBC AUTO: 92 FL (ref 80–99.9)
PDW BLD-RTO: 14 FL (ref 11.5–15)
PLATELET # BLD: 249 E9/L (ref 130–450)
PMV BLD AUTO: 9.6 FL (ref 7–12)
POTASSIUM REFLEX MAGNESIUM: 3.9 MMOL/L (ref 3.5–5)
RBC # BLD: 4.4 E12/L (ref 3.5–5.5)
SODIUM BLD-SCNC: 146 MMOL/L (ref 132–146)
WBC # BLD: 6.1 E9/L (ref 4.5–11.5)

## 2021-03-19 PROCEDURE — 2580000003 HC RX 258: Performed by: SURGERY

## 2021-03-19 PROCEDURE — 80048 BASIC METABOLIC PNL TOTAL CA: CPT

## 2021-03-19 PROCEDURE — 1200000000 HC SEMI PRIVATE

## 2021-03-19 PROCEDURE — 85027 COMPLETE CBC AUTOMATED: CPT

## 2021-03-19 PROCEDURE — 6360000004 HC RX CONTRAST MEDICATION: Performed by: RADIOLOGY

## 2021-03-19 PROCEDURE — 36415 COLL VENOUS BLD VENIPUNCTURE: CPT

## 2021-03-19 PROCEDURE — 74176 CT ABD & PELVIS W/O CONTRAST: CPT

## 2021-03-19 PROCEDURE — 6360000002 HC RX W HCPCS: Performed by: SURGERY

## 2021-03-19 PROCEDURE — 99223 1ST HOSP IP/OBS HIGH 75: CPT | Performed by: SURGERY

## 2021-03-19 RX ADMIN — IOHEXOL 50 ML: 240 INJECTION, SOLUTION INTRATHECAL; INTRAVASCULAR; INTRAVENOUS; ORAL at 11:19

## 2021-03-19 RX ADMIN — SODIUM CHLORIDE: 9 INJECTION, SOLUTION INTRAVENOUS at 08:55

## 2021-03-19 RX ADMIN — ENOXAPARIN SODIUM 40 MG: 40 INJECTION SUBCUTANEOUS at 17:07

## 2021-03-19 RX ADMIN — ONDANSETRON 4 MG: 2 INJECTION INTRAMUSCULAR; INTRAVENOUS at 08:55

## 2021-03-19 ASSESSMENT — PAIN SCALES - GENERAL
PAINLEVEL_OUTOF10: 0

## 2021-03-19 NOTE — PLAN OF CARE
Problem:  Bowel/Gastric:  Goal: Control of bowel function will improve  Description: Control of bowel function will improve  Outcome: Met This Shift  Goal: Ability to achieve a regular elimination pattern will improve  Description: Ability to achieve a regular elimination pattern will improve  3/19/2021 1116 by Shanita Tsai RN  Outcome: Met This Shift  3/19/2021 0219 by Eric Schulz RN  Outcome: Met This Shift     Problem: Nutritional:  Goal: Ability to follow a diet with enough fiber (20 to 30 grams) for normal bowel function will improve  Description: Ability to follow a diet with enough fiber (20 to 30 grams) for normal bowel function will improve  3/19/2021 1116 by Shanita Tsai RN  Outcome: Met This Shift  3/19/2021 0219 by Eric Schulz RN  Outcome: Met This Shift     Problem: Skin Integrity:  Goal: Risk for impaired skin integrity will decrease  Description: Risk for impaired skin integrity will decrease  3/19/2021 1116 by Shanita Tsai RN  Outcome: Met This Shift  3/19/2021 0219 by Eric Schulz RN  Outcome: Met This Shift

## 2021-03-19 NOTE — PLAN OF CARE
Problem: Bowel/Gastric:  Goal: Ability to achieve a regular elimination pattern will improve  Description: Ability to achieve a regular elimination pattern will improve  3/19/2021 0219 by Andree Restrepo RN  Outcome: Met This Shift     Problem:  Bowel/Gastric:  Goal: Ability to achieve a regular elimination pattern will improve  Description: Ability to achieve a regular elimination pattern will improve  3/19/2021 0219 by Andree Restrepo RN  Outcome: Met This Shift     Problem: Nutritional:  Goal: Ability to follow a diet with enough fiber (20 to 30 grams) for normal bowel function will improve  Description: Ability to follow a diet with enough fiber (20 to 30 grams) for normal bowel function will improve  3/19/2021 0219 by Andree Restrepo RN  Outcome: Met This Shift     Problem: Nutritional:  Goal: Ability to follow a diet with enough fiber (20 to 30 grams) for normal bowel function will improve  Description: Ability to follow a diet with enough fiber (20 to 30 grams) for normal bowel function will improve  3/19/2021 0219 by Andree Restrepo RN  Outcome: Met This Shift     Problem: Skin Integrity:  Goal: Risk for impaired skin integrity will decrease  Description: Risk for impaired skin integrity will decrease  3/19/2021 0219 by Andree Restrepo RN  Outcome: Met This Shift

## 2021-03-19 NOTE — H&P
GENERAL SURGERY  HISTORY AND PHYSICAL  3/19/2021    Chief Complaint   Patient presents with    Abdominal Pain     PT took laxative yesterday now having burning in RUQ. States hasn't felt right since eating Cabbage and corned beef yesterday        HPI  Alcira Berger is a 72 y.o. female  W/ PMH previous diverticulitis, lap abdiaziz who presents for evaluation of abdominal pain. She stated this started three days ago. She felt constipated but took a laxative and then started having emesis and abdominal pain. She did have some diarrhea associated with this as well. She came to ER and had a CT which showed proximal dilation and SBO. She denies previous colonoscopy. No family history of Crohns of UC.        Past Medical History:   Diagnosis Date    Diverticulitis     H/O cardiovascular stress test 06/29/2019    Lexiscan    Pneumonia        Past Surgical History:   Procedure Laterality Date    CHOLECYSTECTOMY      GASTRECTOMY         Prior to Admission medications    Not on File       Allergies   Allergen Reactions    Demerol     Levofloxacin        Family History   Problem Relation Age of Onset    Other Mother     Other Father     Stroke Maternal Grandmother     Stroke Maternal Grandfather        Social History     Tobacco Use    Smoking status: Never Smoker    Smokeless tobacco: Never Used   Substance Use Topics    Alcohol use: No     Comment: SOC    Drug use: No         Review of Systems - History obtained from the patient  General ROS: negative obtundation, AMS  ENT ROS: negative rhinorrhea, epistaxis  Allergy and Immunology ROS: negative itchy/watery eyes or nasal congestion  Hematological and Lymphatic ROS: negative spontaneous bleeding or bruising  Endocrine ROS: negative  lethargy, mood swings, palpitations or polydipsia/polyuria  Respiratory ROS: negative sputum changes, stridor, tachypnea or wheezing  Cardiovascular ROS: negative for - loss of consciousness, murmur or orthopnea  Gastrointestinal ROS: nausea, vomiting, abdominal pain, diarrhea  Genito-Urinary ROS: negative for -  genital discharge or hematuria  Musculoskeletal ROS: negative for - focal weakness, gangrene  Psych/Neuro ROS: negative for - visual or auditory hallucinations, suicidal ideation      PHYSICAL EXAM:    Vitals:    03/19/21 0615   BP: 136/65   Pulse: 62   Resp: 18   Temp: 97.8 °F (36.6 °C)   SpO2: 97%       General appearance:  NAD, appears stated age  Head: NCAT, PERRLA, EOMI, red conjunctiva  Neck: supple, no masses, trachea midline  Lungs: Equal chest rise bilateral, no retractions, no wheezing  Heart: Reg rate  Abdomen: soft, minimal tenderness, mild distention  Skin; warm and dry, no cyanosis  Gu: no cva tenderness  Extremities: atraumatic, no focal motor deficits, no open wounds  Psych: No tremor, visual hallucinations    LABS:  CBC  Recent Labs     03/19/21  0405   WBC 6.1   HGB 12.9   HCT 40.5        BMP  Recent Labs     03/19/21  0405      K 3.9   *   CO2 24   BUN 13   CREATININE 0.8   CALCIUM 8.5*     Liver Function  Recent Labs     03/18/21  0936   LIPASE 31   BILITOT 0.5   AST 22   ALT 10   ALKPHOS 144*   PROT 6.3*   LABALBU 4.4     No results for input(s): LACTATE in the last 72 hours. No results for input(s): INR, PTT in the last 72 hours. Invalid input(s): PT    RADIOLOGY    Xr Acute Abd Series Chest 1 Vw    Result Date: 3/18/2021  EXAMINATION: TWO XRAY VIEWS OF THE ABDOMEN AND SINGLE  XRAY VIEW OF THE CHEST 3/18/2021 10:31 am COMPARISON: 02/24/2006 the HISTORY: ORDERING SYSTEM PROVIDED HISTORY: constipation, upper abd burning TECHNOLOGIST PROVIDED HISTORY: Reason for exam:->constipation, upper abd burning FINDINGS: Heart size is normal.  There are no infiltrates or effusions. There is some right upper lobe scarring. Bowel gas pattern is normal.  No abnormal abdominal calcifications are noted. There are degenerative changes in lower lumbar spine.      No acute process     Ct Abdomen Pelvis W Iv Contrast Additional Contrast? None    Result Date: 3/18/2021  EXAMINATION: CT OF THE ABDOMEN AND PELVIS WITH CONTRAST 3/18/2021 11:56 am TECHNIQUE: CT of the abdomen and pelvis was performed with the administration of intravenous contrast. Multiplanar reformatted images are provided for review. Dose modulation, iterative reconstruction, and/or weight based adjustment of the mA/kV was utilized to reduce the radiation dose to as low as reasonably achievable. COMPARISON: November 7, 2020 HISTORY: ORDERING SYSTEM PROVIDED HISTORY: upper abd pain TECHNOLOGIST PROVIDED HISTORY: Additional Contrast?->None Reason for exam:->upper abd pain Decision Support Exception->Emergency Medical Condition (MA) FINDINGS: There are multiple gas and air filled distended loops of small bowel in mid upper abdomen and left abdomen with lumen diameter measuring up to 3.3 cm. Mid and distal loops of small bowel are nondistended. Colon is nondistended. Stool and gas present in the colon. Multiple diverticula are associated with the distal left colon and sigmoid colon. No evidence of acute diverticulitis. The appendix is visualized and normal in the right lower quadrant. No intrahepatic or extrahepatic bile duct dilatation. There is evidence of cholecystectomy. No evidence of acute pancreatitis. Spleen is nonenlarged. Adrenal glands are unremarkable. There is no hydronephrosis or perinephric edema. No lymphadenopathy. Urinary bladder is normal in contour. Stable mass along the right aspect of the uterus may represent a fibroid measuring 2.5 x 2.5 cm. Trace free fluid present in the pelvis. No pneumonia in the visualized lung bases. .     1.  Multiple distended loops of small bowel in mid abdomen and left abdomen suggestive of early or partial small bowel obstruction.  2.  Thickened wall of loops of small bowel in the left lower quadrant as seen on axial image number 92 with subtle adjacent inflammatory changes suggestive of infectious or inflammatory enteritis. Follow-up recommended to exclude possibility of neoplasm. 3.  Trace free fluid in the pelvis. 4.  Diverticulosis. 5.  Stable mass along the right aspect of the uterus likely represents a fibroid. Ultrasound follow-up may be helpful for further evaluation. ASSESSMENT:  72 y.o. female with SBO    PLAN:  Continue NGT  Repeat CT w/ PO contrast  Will need outpatient colonoscopy   NPO  Ambulate  Will discuss further with Dr. Mae Perez    Electronically signed by Tori Freeman DO on 3/19/21 at 7:04 AM EDT    General Surgery Progress Note  [facility]    Patient's Name/Date of Birth: Sly Brooke / 1950    Date: March 19, 2021     Surgeon: Mae Perez MD    Chief Complaint: Ileus    Patient Active Problem List   Diagnosis    Palpitations    Acute diverticulitis    Diverticulitis    Perforated diverticulum of large intestine    SBO (small bowel obstruction) ([address]       Subjective: Had CT abdomen pelvis with oral contrast with contrast in the sigmoid colon.   Patient had 1 bowel movement and has no further nausea    Objective:  BP (!) 140/64   Pulse 56   Temp 97.6 °F (36.4 °C) (Oral)   Resp 18   SpO2 97%   Labs:  Recent Labs     03/18/21  0936 03/19/21  0405   WBC 9.3 6.1   HGB 15.0 12.9   HCT 45.7 40.5     Lab Results   Component Value Date    CREATININE 0.8 03/19/2021    BUN 13 03/19/2021     03/19/2021    K 3.9 03/19/2021     (H) 03/19/2021    CO2 24 03/19/2021     Recent Labs     03/18/21  0936   LIPASE 31     CBC with Differential:    Lab Results   Component Value Date    WBC 6.1 03/19/2021    RBC 4.40 03/19/2021    HGB 12.9 03/19/2021    HCT 40.5 03/19/2021     03/19/2021    MCV 92.0 03/19/2021    MCH 29.3 03/19/2021    MCHC 31.9 03/19/2021    RDW 14.0 03/19/2021    LYMPHOPCT 13.9 03/18/2021    MONOPCT 7.5 03/18/2021    BASOPCT 0.6 03/18/2021    MONOSABS 0.70 03/18/2021    LYMPHSABS 1.30 03/18/2021    EOSABS 0.24 03/18/2021 BASOSABS 0.06 03/18/2021     CMP:    Lab Results   Component Value Date     03/19/2021    K 3.9 03/19/2021     03/19/2021    CO2 24 03/19/2021    BUN 13 03/19/2021    CREATININE 0.8 03/19/2021    GFRAA >60 03/19/2021    LABGLOM >60 03/19/2021    GLUCOSE 99 03/19/2021    GLUCOSE 109 09/15/2011    PROT 6.3 03/18/2021    LABALBU 4.4 03/18/2021    CALCIUM 8.5 03/19/2021    BILITOT 0.5 03/18/2021    ALKPHOS 144 03/18/2021    AST 22 03/18/2021    ALT 10 03/18/2021       General appearance:  NAD  Head: NCAT, PERRLA, EOMI, red conjunctiva  Neck: supple, no masses  Lungs: CTAB, equal chest rise bilateral  Heart: Reg rate  Abdomen: soft, nondistended, nontender  Skin; no lesions  Gu: no cva tenderness  Extremities: extremities normal, atraumatic, no cyanosis or edema      Assessment/Plan:  Elsa Jasso is a 72 y.o. female with ileus, resolving    Remove NG  Full liquid diet and advance as tolerated  Likely home tomorrow    Iraj Berg MD  3/19/2021  2:31 PM

## 2021-03-20 VITALS
OXYGEN SATURATION: 97 % | WEIGHT: 205 LBS | HEART RATE: 52 BPM | BODY MASS INDEX: 29.35 KG/M2 | SYSTOLIC BLOOD PRESSURE: 118 MMHG | RESPIRATION RATE: 16 BRPM | HEIGHT: 70 IN | DIASTOLIC BLOOD PRESSURE: 60 MMHG | TEMPERATURE: 98.3 F

## 2021-03-20 LAB
ANION GAP SERPL CALCULATED.3IONS-SCNC: 10 MMOL/L (ref 7–16)
BUN BLDV-MCNC: 8 MG/DL (ref 8–23)
CALCIUM SERPL-MCNC: 8.4 MG/DL (ref 8.6–10.2)
CHLORIDE BLD-SCNC: 111 MMOL/L (ref 98–107)
CO2: 22 MMOL/L (ref 22–29)
CREAT SERPL-MCNC: 0.7 MG/DL (ref 0.5–1)
GFR AFRICAN AMERICAN: >60
GFR NON-AFRICAN AMERICAN: >60 ML/MIN/1.73
GLUCOSE BLD-MCNC: 91 MG/DL (ref 74–99)
HCT VFR BLD CALC: 36.6 % (ref 34–48)
HEMOGLOBIN: 11.9 G/DL (ref 11.5–15.5)
MCH RBC QN AUTO: 29.3 PG (ref 26–35)
MCHC RBC AUTO-ENTMCNC: 32.5 % (ref 32–34.5)
MCV RBC AUTO: 90.1 FL (ref 80–99.9)
PDW BLD-RTO: 13.3 FL (ref 11.5–15)
PLATELET # BLD: 214 E9/L (ref 130–450)
PMV BLD AUTO: 9.4 FL (ref 7–12)
POTASSIUM REFLEX MAGNESIUM: 3.7 MMOL/L (ref 3.5–5)
RBC # BLD: 4.06 E12/L (ref 3.5–5.5)
SODIUM BLD-SCNC: 143 MMOL/L (ref 132–146)
WBC # BLD: 4.8 E9/L (ref 4.5–11.5)

## 2021-03-20 PROCEDURE — 80048 BASIC METABOLIC PNL TOTAL CA: CPT

## 2021-03-20 PROCEDURE — 36415 COLL VENOUS BLD VENIPUNCTURE: CPT

## 2021-03-20 PROCEDURE — 6360000002 HC RX W HCPCS: Performed by: SURGERY

## 2021-03-20 PROCEDURE — 85027 COMPLETE CBC AUTOMATED: CPT

## 2021-03-20 PROCEDURE — 2580000003 HC RX 258: Performed by: SURGERY

## 2021-03-20 PROCEDURE — 99238 HOSP IP/OBS DSCHRG MGMT 30/<: CPT | Performed by: SURGERY

## 2021-03-20 RX ADMIN — ENOXAPARIN SODIUM 40 MG: 40 INJECTION SUBCUTANEOUS at 08:43

## 2021-03-20 RX ADMIN — SODIUM CHLORIDE: 9 INJECTION, SOLUTION INTRAVENOUS at 02:34

## 2021-03-20 ASSESSMENT — PAIN DESCRIPTION - PROGRESSION: CLINICAL_PROGRESSION: OTHER (COMMENT)

## 2021-03-20 NOTE — PLAN OF CARE
Problem: Bowel/Gastric:  Goal: Control of bowel function will improve  Outcome: Met This Shift     Problem:  Bowel/Gastric:  Goal: Control of bowel function will improve  Outcome: Met This Shift  Goal: Ability to achieve a regular elimination pattern will improve  Outcome: Met This Shift     Problem: Nutritional:  Goal: Ability to follow a diet with enough fiber (20 to 30 grams) for normal bowel function will improve  Outcome: Met This Shift     Problem: Skin Integrity:  Goal: Risk for impaired skin integrity will decrease  Outcome: Met This Shift

## 2021-03-20 NOTE — DISCHARGE SUMMARY
Physician Discharge Summary     Patient ID:  Fidel Kirkpatrick  [id number]  21 y.o.  1950    Admit date: 3/18/2021    Discharge date and time: 3/20/2021    Admitting Physician: Nori Roe MD     Admission Diagnoses:   Patient Active Problem List   Diagnosis    Palpitations    Acute diverticulitis    Diverticulitis    Perforated diverticulum of large intestine    [facility]) [facility])       Discharge Diagnoses:   Patient Active Problem List   Diagnosis    Palpitations    Acute diverticulitis    Diverticulitis    Perforated diverticulum of large intestine    [address] ([address] obstruction) ([facility] [address]       Admission Condition: good    Discharged Condition: good    Indication for Admission: SBO    Hospital Course: Fidel Kirkpatrick is a 72 y.o. female admitted with SBO. She was admitted, hydrated and improved. She had CT with contrast showing advancement to her colon. Diet was advanced, they were ambulating independently and pain was controlled. They were discharged with appropriate medication, instructions and follow up. Consults: medicine    Treatments: IV hydration    In process/preliminary results:  Outstanding Order Results     No orders found from 2/17/2021 to 3/19/2021. Patient Instructions: There are no discharge medications for this patient. Discharge Exam:  General appearance: AAOx3, NAD  Head: NCAT, PERRLA, EOMI, red conjunctiva  Neck: supple, no masses  Lungs: Equal chest rise bilateral  Heart: Reg rate  Abdomen: soft, nondistended, tender appropriately, normotympanic, no guarding, no peritoneal signs, incision C/D/I, drain output serosanguinous, dressing dry  Extremities: extremities normal, atraumatic, no cyanosis or edema    Disposition: home    Patient Instructions: Activity: activity as tolerated  Diet: regular diet  Wound Care: none needed    Follow-up with Dr Dana Kurtz in 2 weeks.     Billy Kerr  3/20/2021  11:39 AM

## 2021-03-22 ENCOUNTER — TELEPHONE (OUTPATIENT)
Dept: SURGERY | Age: 71
End: 2021-03-22

## 2021-03-22 DIAGNOSIS — K57.90 DIVERTICULOSIS: Primary | ICD-10-CM

## 2021-03-22 RX ORDER — POLYETHYLENE GLYCOL 3350, SODIUM SULFATE ANHYDROUS, SODIUM BICARBONATE, SODIUM CHLORIDE, POTASSIUM CHLORIDE 236; 22.74; 6.74; 5.86; 2.97 G/4L; G/4L; G/4L; G/4L; G/4L
4 POWDER, FOR SOLUTION ORAL ONCE
Qty: 4000 ML | Refills: 0 | Status: SHIPPED | OUTPATIENT
Start: 2021-03-22 | End: 2021-03-22

## 2021-03-22 NOTE — TELEPHONE ENCOUNTER
Prior Authorization Form:      DEMOGRAPHICS:                     Patient Name:  Cristin Melvin  Patient :  1950            Insurance:  Payor: Clare Roles / Plan: Hi Comment ESSENTIAL/PLUS / Product Type: *No Product type* /   Insurance ID Number:    Payor/Plan Subscr  Sex Relation Sub. Ins. ID Effective Group Num   1.  BCBS MEDICARENaoma Majestic 1950 Female Self [id number]                                    [id number]         DIAGNOSIS & PROCEDURE:                       Procedure/Operation: Colonoscopy            CPT Code: 39491    Diagnosis:  Diverticulitis    ICD10 Code: K57.90     Location:  [address] [place] [address]    Surgeon:  Joe Alvarado    SCHEDULING INFORMATION:                          Date: 2021    Time: TBD              Anesthesia:  MAC                                                       Status:  Outpatient        Special Comments:         Electronically signed by Levar Dutton on 3/22/2021 at 2:14 PM

## 2021-03-22 NOTE — TELEPHONE ENCOUNTER
Per the order of Dr. Darek Wells, call placed to patient to schedule Colonoscopy. Patient scheduled for colonoscopy on April 5, 2021. Patient provider with bowel prep instructions over the phone and informed that written instructions will also be mailed.   Electronically signed by Andres Aguilar on 3/22/21 at 2:14 PM EDT

## 2021-04-07 ENCOUNTER — HOSPITAL ENCOUNTER (OUTPATIENT)
Age: 71
Discharge: HOME | End: 2021-04-09
Payer: MEDICARE

## 2021-04-07 DIAGNOSIS — Z01.818 PREOP TESTING: ICD-10-CM

## 2021-04-07 PROCEDURE — U0003 INFECTIOUS AGENT DETECTION BY NUCLEIC ACID (DNA OR RNA); SEVERE ACUTE RESPIRATORY SYNDROME CORONAVIRUS 2 (SARS-COV-2) (CORONAVIRUS DISEASE [COVID-19]), AMPLIFIED PROBE TECHNIQUE, MAKING USE OF HIGH THROUGHPUT TECHNOLOGIES AS DESCRIBED BY CMS-2020-01-R: HCPCS

## 2021-04-07 PROCEDURE — U0005 INFEC AGEN DETEC AMPLI PROBE: HCPCS

## 2021-04-08 LAB
SARS-COV-2: NOT DETECTED
SOURCE: NORMAL

## 2021-04-09 NOTE — PROGRESS NOTES
[facility]                                                                                                                     PRE OP INSTRUCTIONS FOR  Manisha         Date: 4/9/2021    Date and time of surgery:   Arrival Time: will call with arrival time    1. Do not eat or drink anything after 12 midnight prior to surgery. This includes no water, chewing gum, mints or ice chips. 2. Take the following pills with a small sip of water on the morning of Surgery: none     3. Diabetics may take evening dose of insulin but none after midnight. If you feel symptomatic or low blood sugar take 1-2 ounces of apple juice only. 4. Aspirin, Ibuprofen, Advil, Naproxen, Vitamin E and other Anti-inflammatory products should be stopped  before surgery  as directed by your physician. 5. Check with your Doctor regarding stopping Plavix, Coumadin, Lovenox, Fragmin or other blood thinners. 6. Do not smoke,use illicit drugs and do not drink any alcoholic beverages 24 hours prior to surgery. 7. You may brush your teeth and gargle the morning of surgery. DO NOT SWALLOW WATER    8. You MUST make arrangements for a responsible adult to take you home after your surgery. You will not be allowed to leave alone or drive yourself home. It is strongly suggested someone stay with you the first 24 hrs. Your surgery will be cancelled if you do not have a ride home. 9. A parent/legal guardian must accompany a child scheduled for surgery and plan to stay at the hospital until the child is discharged. Please do not bring other children with you. 10. Please wear simple, loose fitting clothing to the hospital.  [address] not bring valuables (money, credit cards, checkbooks, etc.) Do not wear any makeup (including no eye makeup) or nail polish on your fingers or toes. 11. DO NOT wear any jewelry or piercings on day of surgery. All body piercing jewelry must be removed. 12.  Shower the night before surgery with ___Antibacterial soap ___Hibiclens. 15. Remember to bring Blood Bank bracelet to the hospital on the day of surgery. 14. If you have a Living Will and Durable Power of  for Healthcare, please bring in a copy. 15. If appropriate bring crutches, inspirex, etc... 12. Notify your Surgeon if you develop any illness between now and surgery time, cough, cold, fever, sore throat, nausea, vomiting, etc.  Please notify your surgeon if you experience dizziness, shortness of breath or blurred vision between now & the time of your surgery. 17. If you have ___dentures, they will be removed before going to the OR; we will provide you a container. If you wear ___contact lenses or ___glasses, they will be removed; please bring a case for them. 18. To provide excellent care visitors will be limited to one in the room at any given time. 19. Please bring picture ID and insurance card. 20. Sleep apnea patients need to bring CPAP to hospital on day of surgery. 21. Visit our web site for additional information: [email or web address] [email or web address]_[email or web address]    22. During flu season no children under the age of 15 are permitted in the hospital for the safety of all patients. 23. Other report to                Please call pre admission testing if you have any further questions.    [facility]     [address]

## 2021-04-12 ENCOUNTER — HOSPITAL ENCOUNTER (OUTPATIENT)
Age: 71
Setting detail: OUTPATIENT SURGERY
Discharge: HOME | End: 2021-04-12
Attending: SURGERY | Admitting: SURGERY
Payer: MEDICARE

## 2021-04-12 ENCOUNTER — ANESTHESIA EVENT (OUTPATIENT)
Dept: ENDOSCOPY | Age: 71
End: 2021-04-12
Payer: MEDICARE

## 2021-04-12 ENCOUNTER — ANESTHESIA (OUTPATIENT)
Dept: ENDOSCOPY | Age: 71
End: 2021-04-12
Payer: MEDICARE

## 2021-04-12 VITALS
RESPIRATION RATE: 18 BRPM | SYSTOLIC BLOOD PRESSURE: 108 MMHG | HEIGHT: 70 IN | TEMPERATURE: 97.1 F | OXYGEN SATURATION: 98 % | WEIGHT: 195 LBS | DIASTOLIC BLOOD PRESSURE: 74 MMHG | BODY MASS INDEX: 27.92 KG/M2 | HEART RATE: 66 BPM

## 2021-04-12 VITALS — SYSTOLIC BLOOD PRESSURE: 91 MMHG | OXYGEN SATURATION: 100 % | DIASTOLIC BLOOD PRESSURE: 53 MMHG

## 2021-04-12 DIAGNOSIS — Z01.818 PREOP TESTING: Primary | ICD-10-CM

## 2021-04-12 PROCEDURE — 7100000011 HC PHASE II RECOVERY - ADDTL 15 MIN: Performed by: SURGERY

## 2021-04-12 PROCEDURE — 88305 TISSUE EXAM BY PATHOLOGIST: CPT

## 2021-04-12 PROCEDURE — 45378 DIAGNOSTIC COLONOSCOPY: CPT | Performed by: SURGERY

## 2021-04-12 PROCEDURE — 3609012400 HC EGD TRANSORAL BIOPSY SINGLE/MULTIPLE: Performed by: SURGERY

## 2021-04-12 PROCEDURE — 43239 EGD BIOPSY SINGLE/MULTIPLE: CPT | Performed by: SURGERY

## 2021-04-12 PROCEDURE — 3609027000 HC COLONOSCOPY: Performed by: SURGERY

## 2021-04-12 PROCEDURE — 6360000002 HC RX W HCPCS: Performed by: NURSE ANESTHETIST, CERTIFIED REGISTERED

## 2021-04-12 PROCEDURE — 3700000001 HC ADD 15 MINUTES (ANESTHESIA): Performed by: SURGERY

## 2021-04-12 PROCEDURE — 2580000003 HC RX 258: Performed by: ANESTHESIOLOGY

## 2021-04-12 PROCEDURE — 7100000010 HC PHASE II RECOVERY - FIRST 15 MIN: Performed by: SURGERY

## 2021-04-12 PROCEDURE — 3700000000 HC ANESTHESIA ATTENDED CARE: Performed by: SURGERY

## 2021-04-12 PROCEDURE — 2709999900 HC NON-CHARGEABLE SUPPLY: Performed by: SURGERY

## 2021-04-12 RX ORDER — SODIUM CHLORIDE 0.9 % (FLUSH) 0.9 %
10 SYRINGE (ML) INJECTION PRN
Status: DISCONTINUED | OUTPATIENT
Start: 2021-04-12 | End: 2021-04-12 | Stop reason: HOSPADM

## 2021-04-12 RX ORDER — SODIUM CHLORIDE 0.9 % (FLUSH) 0.9 %
10 SYRINGE (ML) INJECTION EVERY 12 HOURS SCHEDULED
Status: DISCONTINUED | OUTPATIENT
Start: 2021-04-12 | End: 2021-04-12 | Stop reason: HOSPADM

## 2021-04-12 RX ORDER — SODIUM CHLORIDE, SODIUM LACTATE, POTASSIUM CHLORIDE, CALCIUM CHLORIDE 600; 310; 30; 20 MG/100ML; MG/100ML; MG/100ML; MG/100ML
INJECTION, SOLUTION INTRAVENOUS CONTINUOUS
Status: DISCONTINUED | OUTPATIENT
Start: 2021-04-12 | End: 2021-04-12 | Stop reason: HOSPADM

## 2021-04-12 RX ORDER — PROPOFOL 10 MG/ML
INJECTION, EMULSION INTRAVENOUS PRN
Status: DISCONTINUED | OUTPATIENT
Start: 2021-04-12 | End: 2021-04-12 | Stop reason: SDUPTHER

## 2021-04-12 RX ADMIN — PROPOFOL 50 MG: 10 INJECTION, EMULSION INTRAVENOUS at 08:33

## 2021-04-12 RX ADMIN — SODIUM CHLORIDE, POTASSIUM CHLORIDE, SODIUM LACTATE AND CALCIUM CHLORIDE: 600; 310; 30; 20 INJECTION, SOLUTION INTRAVENOUS at 08:24

## 2021-04-12 RX ADMIN — PROPOFOL 50 MG: 10 INJECTION, EMULSION INTRAVENOUS at 08:35

## 2021-04-12 RX ADMIN — PROPOFOL 50 MG: 10 INJECTION, EMULSION INTRAVENOUS at 08:40

## 2021-04-12 RX ADMIN — PROPOFOL 50 MG: 10 INJECTION, EMULSION INTRAVENOUS at 08:47

## 2021-04-12 RX ADMIN — PROPOFOL 50 MG: 10 INJECTION, EMULSION INTRAVENOUS at 08:42

## 2021-04-12 RX ADMIN — PROPOFOL 50 MG: 10 INJECTION, EMULSION INTRAVENOUS at 08:31

## 2021-04-12 RX ADMIN — PROPOFOL 50 MG: 10 INJECTION, EMULSION INTRAVENOUS at 08:36

## 2021-04-12 RX ADMIN — PROPOFOL 50 MG: 10 INJECTION, EMULSION INTRAVENOUS at 08:44

## 2021-04-12 RX ADMIN — PROPOFOL 50 MG: 10 INJECTION, EMULSION INTRAVENOUS at 08:50

## 2021-04-12 ASSESSMENT — LIFESTYLE VARIABLES: SMOKING_STATUS: 0

## 2021-04-12 NOTE — H&P
General Surgery History and Physical  [facility]    Patient's Name/Date of Birth: Maira Turner / 1950    Date: April 12, 2021     Surgeon: Rosmery Quan MD    PCP: Lebron Hill MD     Chief Complaint: GERD, diverticulitis    HPI:   Maira Turner is a 72 y.o. female who presents for evaluation of GERD and recent hospitalization x 2 for diverticulitis. She has never colonoscopy. She was also recently admitted with SBO and reports intermittent heart but. CT abd/pel suggested hiatal hernia. She denies nausea, vomiting, constipation, diarrhea, headache, chest pain, shortness of breath, fevers, chills. Patient Active Problem List   Diagnosis    Palpitations    Acute diverticulitis    Diverticulitis    Perforated diverticulum of large intestine    [address] ([facility] obstruction) ([facility] [address] [facility]       Past Medical History:   Diagnosis Date    Diverticulitis     H/O cardiovascular stress test 06/29/2019    Lexiscan    Pneumonia        Past Surgical History:   Procedure Laterality Date    CHOLECYSTECTOMY         Allergies   Allergen Reactions    Demerol     Levofloxacin        The patient has a family history that is negative for severe cardiovascular or respiratory issues, negative for reaction to anesthesia. Time spent reviewing past medical, surgical, social and family history, vitals, nursing assessment and images. No changes from above documented history.     Social History     Socioeconomic History    Marital status:      Spouse name: Not on file    Number of children: Not on file    Years of education: Not on file    Highest education level: Not on file   Occupational History    Not on file   Social Needs    Financial resource strain: Not on file    Food insecurity     Worry: Not on file     Inability: Not on file    Transportation needs     Medical: Not on file     Non-medical: Not on file   Tobacco Use    Smoking status: Never Smoker    Smokeless tobacco: Never °F (36.3 °C) (Temporal)   Resp 16   Ht 5' 10\" (1.778 m)   Wt 195 lb (88.5 kg)   SpO2 95%   BMI 27.98 kg/m²   General appearance:  NAD, appears stated age  Head: NCAT, PERRLA, EOMI, red conjunctiva  Neck: supple, no masses, trachea midline  Lungs: Equal chest rise bilateral, no retractions, no wheezing  Heart: Reg rate  Abdomen: soft, nontender, nondistended  Skin; warm and dry, no cyanosis  Gu: no cva tenderness  Extremities: atraumatic, no focal motor deficits, no open wounds  Psych: No tremor, visual hallucinations      Radiology: n/a    Assessment:  Christa Hunter is a 72 y.o. female with GERD, recent diverticulitis  Patient Active Problem List   Diagnosis    Palpitations    Acute diverticulitis    Diverticulitis    Perforated diverticulum of large intestine    [address]BO (small bowel obstruction) ([facility] [address]         Plan:  Proceed with EGD and colonoscopy  -The procedure, risks, benefits and alternatives were discussed with patient. she   agrees to proceed.         Ann-Marie Barbosa MD  8:23 AM  4/12/2021

## 2021-04-12 NOTE — OP NOTE
[address] [place] [address] [facility]          Operative Report    DATE OF PROCEDURE: 4/12/2021    Liliam Jacobson    SURGEON: Thania Munoz MD.     ASSISTANT: None    PREOPERATIVE DIAGNOSES:  GERD, Diverticulitis     POSTOPERATIVE DIAGNOSES:   (1) 3cm hiatal hernia  (2) Diverticulosis    OPERATION: (1) Esophagogastroduodenoscopy with antral biopsy (2) Total Colonoscopy to the cecum       ANESTHESIA: LMAC    History and consent: This is a 72y.o. year old female who is having GERD and had recent diverticulitis without prior colonoscopy. I have discussed with the patient the indication, alternatives, and the possible risks and/or complications of endoscopy and the conscious sedation anesthesia. The patient appears to understand and agrees to proceed. Monitoring and Safety:  normalThe patient was placed on a cardiac monitor and vital signs, pulse oximetry and level of consciousness were continuously evaluated throughout the procedure. The patient was closely monitored until recovery from the medications was complete and the patient had returned to baseline status. Anesthesia was present for the procedure. OPERATIONS: The patient was placed on the table and sedated. Bite block was placed. A lubricated scope was easily passed into the upper esophagus which looked normal. The distal esophagus looked normal. The scope was passed into the stomach and retroflexed. There was 3cm hiatal hernia. The scope was passed down toward the pylorus. The antral mucosa looked normal. Biopsy was taken to check for H. pylori. The scope was then passed through the pylorus into the duodenal bulb which looked normal, then around to the distal duodenum which looked normal, and the scope was then withdrawn. The patient tolerated the procedure well. The patient was placed on the table and sedated by anesthesia.  Digital rectal exam was performed which was normal.  A lubricated scope was passed into the rectum which looked normal.  The scope was passed all the way around to the cecum which appeared normal.  No biopsies were taken. There were no masses, lesions or bleeding. The scope was then slowly withdrawn, each area was examined again on the way out. There was mild sigmoid diverticulosis. The scope was retroflexed in the rectum. There was no abnormality at the anal verge. The scope was withdrawn. The patient tolerated the procedure well.      Complications: none    Diet: GERD    PLAN:  (1) Maximize PPI and discuss hiatal hernia repair if persistent symptoms  (2) Low fiber diet    Further Procedures:  Repeat Colonoscopy in 10 years      Physician Signature: Electronically signed by Dr. Mila Walker M.D.

## 2021-04-12 NOTE — ANESTHESIA PRE PROCEDURE
Department of Anesthesiology  Preprocedure Note       Name:  Ludwig Grayson   Age:  72 y.o.  :  1950                                          MRN:  [id number]         Date:  2021      Surgeon: Carmine Egan):  Amena Donald MD    Procedure: Procedure(s):  EGD ESOPHAGOGASTRODUODENOSCOPY AND COLONOSCOPY  COLONOSCOPY    Medications prior to admission:   Prior to Admission medications    Medication Sig Start Date End Date Taking? Authorizing Provider   VITAMIN D PO Take by mouth   Yes Historical Provider, MD   Probiotic Product (PROBIOTIC DAILY PO) Take by mouth   Yes Historical Provider, MD       Current medications:    Current Facility-Administered Medications   Medication Dose Route Frequency Provider Last Rate Last Admin    sodium chloride flush 0.9 % injection 10 mL  10 mL Intravenous 2 times per day Amena Donald MD        sodium chloride flush 0.9 % injection 10 mL  10 mL Intravenous PRN Amena Donald MD        lactated ringers infusion   Intravenous Continuous Jerilyn Osei MD           Allergies:     Allergies   Allergen Reactions    Demerol     Levofloxacin        Problem List:    Patient Active Problem List   Diagnosis Code    Palpitations R00.2    Acute diverticulitis K57.92    Diverticulitis K57.92    Perforated diverticulum of large intestine K57.20    SBO (small bowel obstruction) ([facility] [address] K56.609       Past Medical History:        Diagnosis Date    Diverticulitis     H/O cardiovascular stress test 2019    Lexiscan    Pneumonia        Past Surgical History:        Procedure Laterality Date    CHOLECYSTECTOMY         Social History:    Social History     Tobacco Use    Smoking status: Never Smoker    Smokeless tobacco: Never Used   Substance Use Topics    Alcohol use: No     Comment: occasional beer                                Counseling given: Not Answered      Vital Signs (Current):   Vitals:    21 0952 21 0745   BP:  111/62   Pulse:  71   Resp:  16 Temp:  97.4 °F (36.3 °C)   TempSrc:  Temporal   SpO2:  95%   Weight: 203 lb (92.1 kg) 195 lb (88.5 kg)   Height: 5' 10\" (1.778 m) 5' 10\" (1.778 m)                                              BP Readings from Last 3 Encounters:   04/12/21 111/62   03/20/21 118/60   10/22/20 136/75       NPO Status: Time of last liquid consumption: 1300                        Time of last solid consumption: 1400                        Date of last liquid consumption: 04/11/21                        Date of last solid food consumption: 04/10/21    BMI:   Wt Readings from Last 3 Encounters:   04/12/21 195 lb (88.5 kg)   03/20/21 205 lb (93 kg)   10/22/20 209 lb 6 oz (95 kg)     Body mass index is 27.98 kg/m². CBC:   Lab Results   Component Value Date    WBC 4.8 03/20/2021    RBC 4.06 03/20/2021    HGB 11.9 03/20/2021    HCT 36.6 03/20/2021    MCV 90.1 03/20/2021    RDW 13.3 03/20/2021     03/20/2021       CMP:   Lab Results   Component Value Date     03/20/2021    K 3.7 03/20/2021     03/20/2021    CO2 22 03/20/2021    BUN 8 03/20/2021    CREATININE 0.7 03/20/2021    GFRAA >60 03/20/2021    LABGLOM >60 03/20/2021    GLUCOSE 91 03/20/2021    GLUCOSE 109 09/15/2011    PROT 6.3 03/18/2021    CALCIUM 8.4 03/20/2021    BILITOT 0.5 03/18/2021    ALKPHOS 144 03/18/2021    AST 22 03/18/2021    ALT 10 03/18/2021       POC Tests: No results for input(s): POCGLU, POCNA, POCK, POCCL, POCBUN, POCHEMO, POCHCT in the last 72 hours.     Coags: No results found for: PROTIME, INR, APTT    HCG (If Applicable): No results found for: PREGTESTUR, PREGSERUM, HCG, HCGQUANT     ABGs: No results found for: PHART, PO2ART, MFN7KSR, OWD3BCX, BEART, [id number]     Type & Screen (If Applicable):  No results found for: LABABO, LABRH    Drug/Infectious Status (If Applicable):  No results found for: HIV, HEPCAB    COVID-19 Screening (If Applicable):   Lab Results   Component Value Date    COVID19 Not Detected 04/07/2021           Anesthesia Evaluation  Patient summary reviewed no history of anesthetic complications:   Airway: Mallampati: II  TM distance: >3 FB   Neck ROM: full  Mouth opening: > = 3 FB Dental: normal exam         Pulmonary: breath sounds clear to auscultation      (-) not a current smoker                           Cardiovascular:Negative CV ROS            Rhythm: regular  Rate: normal                    Neuro/Psych:   Negative Neuro/Psych ROS              GI/Hepatic/Renal:   (+) bowel prep,      (-) no morbid obesity      ROS comment: Hx. Diverticulitis, SBO (small bowel obstruction). Endo/Other: Negative Endo/Other ROS                    Abdominal:         (-) obese     Vascular: negative vascular ROS. Anesthesia Plan      MAC     ASA 2       Induction: intravenous. Anesthetic plan and risks discussed with patient. Plan discussed with CRNA. DOS STAFF ADDENDUM:    Pt seen and examined, chart reviewed (including anesthesia, drug and allergy history). Anesthetic plan, risks, benefits, alternatives, and personnel involved discussed with patient. Patient verbalized an understanding and agrees to proceed. Plan discussed with care team members and agreed upon.     Delores Emerson MD  Staff Anesthesiologist  8:19 AM      Delores Emerson MD   4/12/2021

## 2021-04-20 ENCOUNTER — OFFICE VISIT (OUTPATIENT)
Dept: SURGERY | Age: 71
End: 2021-04-20

## 2021-04-20 VITALS
TEMPERATURE: 98.1 F | SYSTOLIC BLOOD PRESSURE: 138 MMHG | WEIGHT: 195 LBS | DIASTOLIC BLOOD PRESSURE: 88 MMHG | HEART RATE: 68 BPM | HEIGHT: 70 IN | BODY MASS INDEX: 27.92 KG/M2

## 2021-04-20 DIAGNOSIS — K57.92 DIVERTICULITIS: Primary | ICD-10-CM

## 2021-04-20 PROCEDURE — 99024 POSTOP FOLLOW-UP VISIT: CPT | Performed by: SURGERY

## 2021-04-20 NOTE — PROGRESS NOTES
General Surgery Office Note  [facility]  Consandre P. Nicolette Ramirez MD    Patient's Name/Date of Birth: Avani Parent / 1950    Date: April 20, 2021     Surgeon: Nicolette Ramirez MD    Chief Complaint:   Chief Complaint   Patient presents with    Results     EGD and colonoscopy       Patient Active Problem List   Diagnosis    Palpitations    Acute diverticulitis    Diverticulitis    Perforated diverticulum of large intestine    SBO (small bowel obstruction) ([address]       Subjective: No issues since endoscopy. Patient is doing well and has no complaints. She denies any significant heartburn. No left lower quadrant abdominal pain since being in the hospital.    Objective:  /88   Pulse 68   Temp 98.1 °F (36.7 °C) (Temporal)   Ht 5' 10\" (1.778 m)   Wt 195 lb (88.5 kg)   BMI 27.98 kg/m²   Labs:  No results for input(s): WBC, HGB, HCT in the last 72 hours. Invalid input(s): PLR  Lab Results   Component Value Date    CREATININE 0.7 03/20/2021    BUN 8 03/20/2021     03/20/2021    K 3.7 03/20/2021     (H) 03/20/2021    CO2 22 03/20/2021     No results for input(s): LIPASE, AMYLASE in the last 72 hours. General appearance: AA, NAD  HEENT: NCAT, PERRLA, EOMI  Lungs: Clear, equal rise bilateral  Heart: Reg  Abdomen: soft, nondistended, nontender  Skin: No lesions, incisions well healed  Psych: No distress, conversive, no hallucinations  : No ulcers or lesions  Rectal: No bleeding    A complete 10 system review was performed and are otherwise negative unless mentioned in the above HPI. Specific negatives are listed below but may not include all those reviewed.     General ROS: negative obtundation, AMS  ENT ROS: negative rhinorrhea, epistaxis  Allergy and Immunology ROS: negative itchy/watery eyes or nasal congestion  Hematological and Lymphatic ROS: negative spontaneous bleeding or bruising  Endocrine ROS: negative  lethargy, mood swings, palpitations or polydipsia/polyuria  Respiratory ROS: negative sputum changes, stridor, tachypnea or wheezing  Cardiovascular ROS: negative for - loss of consciousness, murmur or orthopnea  Gastrointestinal ROS: negative for - hematochezia or hematemesis  Genito-Urinary ROS: negative for -  genital discharge or hematuria  Musculoskeletal ROS: negative for - focal weakness, gangrene  Psych/Neuro ROS: negative for - visual or auditory hallucinations, suicidal ideation      Time spent reviewing past medical, surgical, social and family history, vitals, nursing assessment and images.     Imaging:  N/A    Pathology: Normal EGD biopsy    Assessment/Plan:  Liliam Jacobson is a 72 y.o. female status post EGD and colonoscopy for GERD and recent diverticulitis, doing well    We discussed that the patient has 3 cm hiatal hernia  Discussed returning to emergency room if left lower quadrant pain return  Patient currently not taking any antacids, instructed to take over-the-counter omeprazole if heartburn worsens  Follow-up as needed    Physician Signature: Electronically signed by Dr. Cecilio Brewer  4/20/2021  9:39 AM

## 2021-06-24 ENCOUNTER — HOSPITAL ENCOUNTER (OUTPATIENT)
Dept: MAMMOGRAPHY | Age: 71
Discharge: HOME | End: 2021-06-26
Payer: MEDICARE

## 2021-06-24 VITALS — WEIGHT: 198 LBS | BODY MASS INDEX: 28.35 KG/M2 | HEIGHT: 70 IN

## 2021-06-24 DIAGNOSIS — Z12.31 ENCOUNTER FOR SCREENING MAMMOGRAM FOR MALIGNANT NEOPLASM OF BREAST: ICD-10-CM

## 2021-06-24 PROCEDURE — 77063 BREAST TOMOSYNTHESIS BI: CPT

## 2022-06-03 ENCOUNTER — HOSPITAL ENCOUNTER (EMERGENCY)
Age: 72
Discharge: HOME | End: 2022-06-03
Attending: EMERGENCY MEDICINE
Payer: MEDICARE

## 2022-06-03 ENCOUNTER — APPOINTMENT (OUTPATIENT)
Dept: GENERAL RADIOLOGY | Age: 72
End: 2022-06-03
Payer: MEDICARE

## 2022-06-03 VITALS
TEMPERATURE: 96.9 F | SYSTOLIC BLOOD PRESSURE: 139 MMHG | DIASTOLIC BLOOD PRESSURE: 78 MMHG | OXYGEN SATURATION: 95 % | RESPIRATION RATE: 18 BRPM | HEART RATE: 60 BPM | WEIGHT: 189 LBS | BODY MASS INDEX: 27.12 KG/M2

## 2022-06-03 DIAGNOSIS — K21.9 GASTROESOPHAGEAL REFLUX DISEASE, UNSPECIFIED WHETHER ESOPHAGITIS PRESENT: Primary | ICD-10-CM

## 2022-06-03 LAB
ANION GAP SERPL CALCULATED.3IONS-SCNC: 10 MMOL/L (ref 7–16)
BASOPHILS ABSOLUTE: 0.06 E9/L (ref 0–0.2)
BASOPHILS RELATIVE PERCENT: 1 % (ref 0–2)
BUN BLDV-MCNC: 12 MG/DL (ref 6–23)
CALCIUM SERPL-MCNC: 9.4 MG/DL (ref 8.6–10.2)
CHLORIDE BLD-SCNC: 109 MMOL/L (ref 98–107)
CO2: 24 MMOL/L (ref 22–29)
CREAT SERPL-MCNC: 0.8 MG/DL (ref 0.5–1)
EOSINOPHILS ABSOLUTE: 0.2 E9/L (ref 0.05–0.5)
EOSINOPHILS RELATIVE PERCENT: 3.4 % (ref 0–6)
GFR AFRICAN AMERICAN: >60
GFR NON-AFRICAN AMERICAN: >60 ML/MIN/1.73
GLUCOSE BLD-MCNC: 110 MG/DL (ref 74–99)
HCT VFR BLD CALC: 42.5 % (ref 34–48)
HEMOGLOBIN: 14 G/DL (ref 11.5–15.5)
IMMATURE GRANULOCYTES #: 0.01 E9/L
IMMATURE GRANULOCYTES %: 0.2 % (ref 0–5)
LYMPHOCYTES ABSOLUTE: 1.63 E9/L (ref 1.5–4)
LYMPHOCYTES RELATIVE PERCENT: 27.6 % (ref 20–42)
MCH RBC QN AUTO: 29 PG (ref 26–35)
MCHC RBC AUTO-ENTMCNC: 32.9 % (ref 32–34.5)
MCV RBC AUTO: 88 FL (ref 80–99.9)
MONOCYTES ABSOLUTE: 0.41 E9/L (ref 0.1–0.95)
MONOCYTES RELATIVE PERCENT: 6.9 % (ref 2–12)
NEUTROPHILS ABSOLUTE: 3.6 E9/L (ref 1.8–7.3)
NEUTROPHILS RELATIVE PERCENT: 60.9 % (ref 43–80)
PDW BLD-RTO: 13 FL (ref 11.5–15)
PLATELET # BLD: 271 E9/L (ref 130–450)
PMV BLD AUTO: 9.6 FL (ref 7–12)
POTASSIUM REFLEX MAGNESIUM: 3.7 MMOL/L (ref 3.5–5)
RBC # BLD: 4.83 E12/L (ref 3.5–5.5)
SODIUM BLD-SCNC: 143 MMOL/L (ref 132–146)
TROPONIN, HIGH SENSITIVITY: 8 NG/L (ref 0–9)
TROPONIN, HIGH SENSITIVITY: 8 NG/L (ref 0–9)
WBC # BLD: 5.9 E9/L (ref 4.5–11.5)

## 2022-06-03 PROCEDURE — 2500000003 HC RX 250 WO HCPCS: Performed by: EMERGENCY MEDICINE

## 2022-06-03 PROCEDURE — 36415 COLL VENOUS BLD VENIPUNCTURE: CPT

## 2022-06-03 PROCEDURE — 93005 ELECTROCARDIOGRAM TRACING: CPT | Performed by: PHYSICIAN ASSISTANT

## 2022-06-03 PROCEDURE — 71046 X-RAY EXAM CHEST 2 VIEWS: CPT

## 2022-06-03 PROCEDURE — 80048 BASIC METABOLIC PNL TOTAL CA: CPT

## 2022-06-03 PROCEDURE — 6370000000 HC RX 637 (ALT 250 FOR IP): Performed by: EMERGENCY MEDICINE

## 2022-06-03 PROCEDURE — 6360000002 HC RX W HCPCS: Performed by: EMERGENCY MEDICINE

## 2022-06-03 PROCEDURE — 85025 COMPLETE CBC W/AUTO DIFF WBC: CPT

## 2022-06-03 PROCEDURE — A4216 STERILE WATER/SALINE, 10 ML: HCPCS | Performed by: EMERGENCY MEDICINE

## 2022-06-03 PROCEDURE — 2580000003 HC RX 258: Performed by: EMERGENCY MEDICINE

## 2022-06-03 PROCEDURE — 84484 ASSAY OF TROPONIN QUANT: CPT

## 2022-06-03 PROCEDURE — 99285 EMERGENCY DEPT VISIT HI MDM: CPT

## 2022-06-03 PROCEDURE — 96375 TX/PRO/DX INJ NEW DRUG ADDON: CPT

## 2022-06-03 PROCEDURE — 96374 THER/PROPH/DIAG INJ IV PUSH: CPT

## 2022-06-03 RX ORDER — ONDANSETRON 2 MG/ML
4 INJECTION INTRAMUSCULAR; INTRAVENOUS ONCE
Status: COMPLETED | OUTPATIENT
Start: 2022-06-03 | End: 2022-06-03

## 2022-06-03 RX ORDER — SUCRALFATE 1 G/1
1 TABLET ORAL 4 TIMES DAILY
Qty: 40 TABLET | Refills: 0 | Status: SHIPPED | OUTPATIENT
Start: 2022-06-03

## 2022-06-03 RX ORDER — OMEPRAZOLE 20 MG/1
20 CAPSULE, DELAYED RELEASE ORAL EVERY 12 HOURS
Qty: 28 CAPSULE | Refills: 0 | Status: SHIPPED | OUTPATIENT
Start: 2022-06-03 | End: 2022-06-17

## 2022-06-03 RX ADMIN — ONDANSETRON 4 MG: 2 INJECTION INTRAMUSCULAR; INTRAVENOUS at 12:05

## 2022-06-03 RX ADMIN — FAMOTIDINE 20 MG: 10 INJECTION, SOLUTION INTRAVENOUS at 13:14

## 2022-06-03 RX ADMIN — ALUMINUM HYDROXIDE, MAGNESIUM HYDROXIDE, AND SIMETHICONE: 200; 200; 20 SUSPENSION ORAL at 12:05

## 2022-06-03 ASSESSMENT — ENCOUNTER SYMPTOMS
ABDOMINAL PAIN: 0
TROUBLE SWALLOWING: 0
NAUSEA: 0
COUGH: 0
VOMITING: 0
BACK PAIN: 0
SINUS PRESSURE: 0
CHEST TIGHTNESS: 0
SHORTNESS OF BREATH: 0
WHEEZING: 0
ABDOMINAL DISTENTION: 0
SORE THROAT: 0
DIARRHEA: 0
RHINORRHEA: 0

## 2022-06-03 ASSESSMENT — PAIN - FUNCTIONAL ASSESSMENT: PAIN_FUNCTIONAL_ASSESSMENT: 0-10

## 2022-06-03 ASSESSMENT — PAIN DESCRIPTION - ORIENTATION: ORIENTATION: RIGHT

## 2022-06-03 ASSESSMENT — PAIN DESCRIPTION - LOCATION: LOCATION: ABDOMEN;CHEST;THROAT

## 2022-06-03 ASSESSMENT — PAIN SCALES - GENERAL: PAINLEVEL_OUTOF10: 7

## 2022-06-03 NOTE — ED PROVIDER NOTES
Chief complaint:  Chest pain    HPI history provided by the patient  Patient resents here with a known history of significant hiatal hernia, has been told that if she flares up much more she is going to have to have a procedure done. She is on an acids at home. States that this morning she forgot to eat any breakfast and drank her coffee and immediately developed burning and pain throughout the epigastrium, sternal and throat regions feeling like severe heartburn that is not going away. No palpitations or shortness of breath. No vomiting. No general abdominal pain. No back or flank pain. No preceding sore throat, runny nose nasal congestion or URI symptoms. No treatment prior to arrival.  Nothing is making it better at this time. Review of Systems   Constitutional: Negative for chills, diaphoresis, fatigue and fever. HENT: Negative for congestion, postnasal drip, rhinorrhea, sinus pressure, sore throat and trouble swallowing. Respiratory: Negative for cough, chest tightness, shortness of breath and wheezing. Cardiovascular: Positive for chest pain. Negative for palpitations and leg swelling. Gastrointestinal: Negative for abdominal distention, abdominal pain, diarrhea, nausea and vomiting. Genitourinary: Negative for dysuria, flank pain, frequency and urgency. Musculoskeletal: Negative for arthralgias, back pain, gait problem, joint swelling, myalgias, neck pain and neck stiffness. Skin: Negative for rash and wound. Neurological: Negative for dizziness, seizures, syncope, speech difficulty, weakness, light-headedness, numbness and headaches. All other systems reviewed and are negative. Physical Exam  Vitals and nursing note reviewed. Constitutional:       General: She is awake. She is not in acute distress. Appearance: She is well-developed. She is not ill-appearing, toxic-appearing or diaphoretic. HENT:      Head: Normocephalic and atraumatic.       Mouth/Throat: Pharynx: Oropharynx is clear. Uvula midline. No pharyngeal swelling, oropharyngeal exudate, posterior oropharyngeal erythema or uvula swelling. Tonsils: No tonsillar exudate or tonsillar abscesses. Eyes:      Pupils: Pupils are equal, round, and reactive to light. Cardiovascular:      Rate and Rhythm: Normal rate and regular rhythm. Heart sounds: Normal heart sounds. No murmur heard. Pulmonary:      Effort: Pulmonary effort is normal. No respiratory distress. Breath sounds: Normal breath sounds. No stridor, decreased air movement or transmitted upper airway sounds. No decreased breath sounds, wheezing, rhonchi or rales. Chest:      Chest wall: No tenderness. Abdominal:      General: Bowel sounds are normal. There is no distension. Palpations: Abdomen is soft. Tenderness: There is no abdominal tenderness. There is no right CVA tenderness, left CVA tenderness, guarding or rebound. Musculoskeletal:         General: No swelling, tenderness, deformity or signs of injury. Cervical back: Normal range of motion and neck supple. No signs of trauma or rigidity. No pain with movement, spinous process tenderness or muscular tenderness. Normal range of motion. Right lower leg: No edema. Left lower leg: No edema. Comments: Arms legs are neurovascular intact, no pretibial edema or calf pain. Skin:     General: Skin is warm and dry. Coloration: Skin is not cyanotic, jaundiced, mottled or pale. Findings: No erythema or rash. Neurological:      General: No focal deficit present. Mental Status: She is alert and oriented to person, place, and time. GCS: GCS eye subscore is 4. GCS verbal subscore is 5. GCS motor subscore is 6. Cranial Nerves: Cranial nerves are intact. No cranial nerve deficit. Sensory: Sensation is intact. Motor: Motor function is intact. Coordination: Coordination is intact.  Coordination normal.   Psychiatric: Behavior: Behavior is cooperative. Procedures     [facility]     ED Course as of 06/03/22 1443   Fri Jun 03, 2022   1303 Patient laying the bed resting comfortably no distress. Appears very comfortable. Exam is otherwise unchanged. She does states she is feeling better although has some mild residual symptoms. Work-up results are discussed with her. [NC]   1424 Patient resting comfortably no distress states all symptoms completely resolved. She is feeling much better now. [NC]      ED Course User Index  [NC] Keiko Aquino DO     EKG Interpretation    Interpreted by emergency department physician    Rhythm: normal sinus   Rate: 75  Axis: normal  Ectopy: none  Conduction: left posterior fasciclar block  ST Segments: no acute change  T Waves: no acute change  Q Waves: none    Clinical Impression: no acute changes    Keiko Aquino DO     ED Course as of 06/03/22 1443   Fri Jun 03, 2022   1303 Patient laying the bed resting comfortably no distress. Appears very comfortable. Exam is otherwise unchanged. She does states she is feeling better although has some mild residual symptoms. Work-up results are discussed with her. [NC]   1424 Patient resting comfortably no distress states all symptoms completely resolved. She is feeling much better now. [NC]      ED Course User Index  [NC] Keiko Aquino,        --------------------------------------------- PAST HISTORY ---------------------------------------------  Past Medical History:  has a past medical history of Diverticulitis, H/O cardiovascular stress test, and Pneumonia. Past Surgical History:  has a past surgical history that includes Cholecystectomy; Upper gastrointestinal endoscopy (N/A, 4/12/2021); and Colonoscopy (N/A, 4/12/2021). Social History:  reports that she has never smoked. She has never used smokeless tobacco. She reports that she does not drink alcohol and does not use drugs.     Family History: family history includes Other in her father and mother; Stroke in her maternal grandfather and maternal grandmother. The patients home medications have been reviewed. Allergies: Demerol and Levofloxacin    -------------------------------------------------- RESULTS -------------------------------------------------  Labs:  Results for orders placed or performed during the hospital encounter of 06/03/22   CBC with Auto Differential   Result Value Ref Range    WBC 5.9 4.5 - 11.5 E9/L    RBC 4.83 3.50 - 5.50 E12/L    Hemoglobin 14.0 11.5 - 15.5 g/dL    Hematocrit 42.5 34.0 - 48.0 %    MCV 88.0 80.0 - 99.9 fL    MCH 29.0 26.0 - 35.0 pg    MCHC 32.9 32.0 - 34.5 %    RDW 13.0 11.5 - 15.0 fL    Platelets 527 322 - 120 E9/L    MPV 9.6 7.0 - 12.0 fL    Neutrophils % 60.9 43.0 - 80.0 %    Immature Granulocytes % 0.2 0.0 - 5.0 %    Lymphocytes % 27.6 20.0 - 42.0 %    Monocytes % 6.9 2.0 - 12.0 %    Eosinophils % 3.4 0.0 - 6.0 %    Basophils % 1.0 0.0 - 2.0 %    Neutrophils Absolute 3.60 1.80 - 7.30 E9/L    Immature Granulocytes # 0.01 E9/L    Lymphocytes Absolute 1.63 1.50 - 4.00 E9/L    Monocytes Absolute 0.41 0.10 - 0.95 E9/L    Eosinophils Absolute 0.20 0.05 - 0.50 E9/L    Basophils Absolute 0.06 0.00 - 0.20 L5/W   Basic Metabolic Panel w/ Reflex to MG   Result Value Ref Range    Sodium 143 132 - 146 mmol/L    Potassium reflex Magnesium 3.7 3.5 - 5.0 mmol/L    Chloride 109 (H) 98 - 107 mmol/L    CO2 24 22 - 29 mmol/L    Anion Gap 10 7 - 16 mmol/L    Glucose 110 (H) 74 - 99 mg/dL    BUN 12 6 - 23 mg/dL    CREATININE 0.8 0.5 - 1.0 mg/dL    GFR Non-African American >60 >=60 mL/min/1.73    GFR African American >60     Calcium 9.4 8.6 - 10.2 mg/dL   Troponin   Result Value Ref Range    Troponin, High Sensitivity 8 0 - 9 ng/L   Troponin   Result Value Ref Range    Troponin, High Sensitivity 8 0 - 9 ng/L       Radiology:  XR CHEST (2 VW)   Final Result   No acute process.              ------------------------- NURSING NOTES AND VITALS REVIEWED ---------------------------  Date / Time Roomed:  6/3/2022 11:20 AM  ED Bed Assignment:  20/20    The nursing notes within the ED encounter and vital signs as below have been reviewed. /78   Pulse 60   Temp 96.9 °F (36.1 °C) (Temporal)   Resp 18   Wt 189 lb (85.7 kg)   SpO2 95%   BMI 27.12 kg/m²   Oxygen Saturation Interpretation: Normal      ------------------------------------------ PROGRESS NOTES ------------------------------------------  I have spoken with the patient and discussed todays results, in addition to providing specific details for the plan of care and counseling regarding the diagnosis and prognosis. Their questions are answered at this time and they are agreeable with the plan. I discussed at length with them reasons for immediate return here for re evaluation. They will followup with primary care by calling their office tomorrow. --------------------------------- ADDITIONAL PROVIDER NOTES ---------------------------------  At this time the patient is without objective evidence of an acute process requiring hospitalization or inpatient management. They have remained hemodynamically stable throughout their entire ED visit and are stable for discharge with outpatient follow-up. The plan has been discussed in detail and they are aware of the specific conditions for emergent return, as well as the importance of follow-up. New Prescriptions    OMEPRAZOLE (PRILOSEC) 20 MG DELAYED RELEASE CAPSULE    Take 1 capsule by mouth every 12 hours for 14 days    SUCRALFATE (CARAFATE) 1 GM TABLET    Take 1 tablet by mouth 4 times daily       Diagnosis:  1. Gastroesophageal reflux disease, unspecified whether esophagitis present        Disposition:  Patient's disposition: Discharge to home  Patient's condition is stable.          Sherron Sharp DO  06/03/22 1445

## 2022-06-04 LAB
EKG ATRIAL RATE: 75 BPM
EKG P AXIS: 45 DEGREES
EKG P-R INTERVAL: 140 MS
EKG Q-T INTERVAL: 406 MS
EKG QRS DURATION: 78 MS
EKG QTC CALCULATION (BAZETT): 453 MS
EKG R AXIS: 113 DEGREES
EKG T AXIS: 56 DEGREES
EKG VENTRICULAR RATE: 75 BPM

## 2022-07-27 PROCEDURE — 93005 ELECTROCARDIOGRAM TRACING: CPT | Performed by: EMERGENCY MEDICINE

## 2022-07-27 PROCEDURE — 99285 EMERGENCY DEPT VISIT HI MDM: CPT

## 2022-07-28 ENCOUNTER — APPOINTMENT (OUTPATIENT)
Dept: GENERAL RADIOLOGY | Age: 72
End: 2022-07-28
Payer: MEDICARE

## 2022-07-28 ENCOUNTER — HOSPITAL ENCOUNTER (EMERGENCY)
Age: 72
Discharge: HOME | End: 2022-07-28
Attending: EMERGENCY MEDICINE
Payer: MEDICARE

## 2022-07-28 VITALS
SYSTOLIC BLOOD PRESSURE: 139 MMHG | OXYGEN SATURATION: 94 % | WEIGHT: 189 LBS | DIASTOLIC BLOOD PRESSURE: 72 MMHG | HEIGHT: 70 IN | TEMPERATURE: 98.6 F | HEART RATE: 64 BPM | BODY MASS INDEX: 27.06 KG/M2 | RESPIRATION RATE: 20 BRPM

## 2022-07-28 DIAGNOSIS — R07.9 CHEST PAIN, UNSPECIFIED TYPE: Primary | ICD-10-CM

## 2022-07-28 LAB
ANION GAP SERPL CALCULATED.3IONS-SCNC: 11 MMOL/L (ref 7–16)
BUN BLDV-MCNC: 9 MG/DL (ref 6–23)
CALCIUM SERPL-MCNC: 9.4 MG/DL (ref 8.6–10.2)
CHLORIDE BLD-SCNC: 103 MMOL/L (ref 98–107)
CO2: 23 MMOL/L (ref 22–29)
CREAT SERPL-MCNC: 0.7 MG/DL (ref 0.5–1)
EKG ATRIAL RATE: 75 BPM
EKG P AXIS: 43 DEGREES
EKG P-R INTERVAL: 140 MS
EKG Q-T INTERVAL: 364 MS
EKG QRS DURATION: 78 MS
EKG QTC CALCULATION (BAZETT): 406 MS
EKG R AXIS: -32 DEGREES
EKG T AXIS: 108 DEGREES
EKG VENTRICULAR RATE: 75 BPM
GFR AFRICAN AMERICAN: >60
GFR NON-AFRICAN AMERICAN: >60 ML/MIN/1.73
GLUCOSE BLD-MCNC: 107 MG/DL (ref 74–99)
HCT VFR BLD CALC: 40.2 % (ref 34–48)
HEMOGLOBIN: 13.7 G/DL (ref 11.5–15.5)
INFLUENZA A BY PCR: NOT DETECTED
INFLUENZA B BY PCR: NOT DETECTED
MCH RBC QN AUTO: 29 PG (ref 26–35)
MCHC RBC AUTO-ENTMCNC: 34.1 % (ref 32–34.5)
MCV RBC AUTO: 85 FL (ref 80–99.9)
PDW BLD-RTO: 12.6 FL (ref 11.5–15)
PLATELET # BLD: 274 E9/L (ref 130–450)
PMV BLD AUTO: 9.6 FL (ref 7–12)
POTASSIUM SERPL-SCNC: 3.8 MMOL/L (ref 3.5–5)
RBC # BLD: 4.73 E12/L (ref 3.5–5.5)
SARS-COV-2, NAAT: NOT DETECTED
SODIUM BLD-SCNC: 137 MMOL/L (ref 132–146)
STREP GRP A PCR: NEGATIVE
TROPONIN, HIGH SENSITIVITY: 9 NG/L (ref 0–9)
WBC # BLD: 6 E9/L (ref 4.5–11.5)

## 2022-07-28 PROCEDURE — 6370000000 HC RX 637 (ALT 250 FOR IP): Performed by: EMERGENCY MEDICINE

## 2022-07-28 PROCEDURE — 71045 X-RAY EXAM CHEST 1 VIEW: CPT

## 2022-07-28 PROCEDURE — 84484 ASSAY OF TROPONIN QUANT: CPT

## 2022-07-28 PROCEDURE — 85027 COMPLETE CBC AUTOMATED: CPT

## 2022-07-28 PROCEDURE — 87880 STREP A ASSAY W/OPTIC: CPT

## 2022-07-28 PROCEDURE — 87502 INFLUENZA DNA AMP PROBE: CPT

## 2022-07-28 PROCEDURE — 36415 COLL VENOUS BLD VENIPUNCTURE: CPT

## 2022-07-28 PROCEDURE — 87635 SARS-COV-2 COVID-19 AMP PRB: CPT

## 2022-07-28 PROCEDURE — 80048 BASIC METABOLIC PNL TOTAL CA: CPT

## 2022-07-28 RX ORDER — ASPIRIN 81 MG/1
324 TABLET, CHEWABLE ORAL ONCE
Status: COMPLETED | OUTPATIENT
Start: 2022-07-28 | End: 2022-07-28

## 2022-07-28 RX ADMIN — ASPIRIN 81 MG CHEWABLE TABLET 324 MG: 81 TABLET CHEWABLE at 00:55

## 2022-07-28 ASSESSMENT — ENCOUNTER SYMPTOMS
COUGH: 0
ABDOMINAL DISTENTION: 0
BACK PAIN: 0
ABDOMINAL PAIN: 0
SORE THROAT: 0
WHEEZING: 0
SINUS PRESSURE: 0
SHORTNESS OF BREATH: 0
DIARRHEA: 0
VOMITING: 0
NAUSEA: 0
EYE PAIN: 0
EYE DISCHARGE: 0
EYE REDNESS: 0

## 2022-07-28 ASSESSMENT — PAIN DESCRIPTION - FREQUENCY: FREQUENCY: CONTINUOUS

## 2022-07-28 ASSESSMENT — PAIN DESCRIPTION - LOCATION: LOCATION: CHEST;SHOULDER

## 2022-07-28 ASSESSMENT — PAIN DESCRIPTION - PAIN TYPE: TYPE: ACUTE PAIN

## 2022-07-28 ASSESSMENT — PAIN - FUNCTIONAL ASSESSMENT: PAIN_FUNCTIONAL_ASSESSMENT: 0-10

## 2022-07-28 ASSESSMENT — PAIN DESCRIPTION - ORIENTATION: ORIENTATION: RIGHT;LEFT;UPPER

## 2022-07-28 ASSESSMENT — PAIN SCALES - GENERAL: PAINLEVEL_OUTOF10: 8

## 2022-07-28 NOTE — ED PROVIDER NOTES
Patient reports chest pain beginning this morning. Radiates to the left side of the chest.  She also describes some shotty anterior lymphadenopathy. No shortness of breath, nausea, vomiting or other complaints. The history is provided by the patient. Chest Pain  Pain location:  L chest  Pain quality: aching    Pain radiates to:  L shoulder  Pain severity:  Moderate  Onset quality:  Gradual  Duration:  1 day  Timing:  Constant  Progression:  Worsening  Chronicity:  New  Context: at rest    Context: not breathing and not raising an arm    Relieved by:  Nothing  Worsened by:  Nothing  Ineffective treatments:  None tried  Associated symptoms: no abdominal pain, no anxiety, no back pain, no cough, no fatigue, no fever, no headache, no nausea, no shortness of breath, no vomiting and no weakness       Review of Systems   Constitutional:  Negative for chills, fatigue and fever. HENT:  Negative for ear pain, sinus pressure and sore throat. Eyes:  Negative for pain, discharge and redness. Respiratory:  Negative for cough, shortness of breath and wheezing. Cardiovascular:  Positive for chest pain. Gastrointestinal:  Negative for abdominal distention, abdominal pain, diarrhea, nausea and vomiting. Genitourinary:  Negative for dysuria and frequency. Musculoskeletal:  Negative for arthralgias and back pain. Skin:  Negative for rash and wound. Neurological:  Negative for weakness and headaches. Hematological:  Negative for adenopathy. All other systems reviewed and are negative. Physical Exam  Vitals and nursing note reviewed. Constitutional:       Appearance: She is well-developed. HENT:      Head: Normocephalic and atraumatic. Eyes:      Pupils: Pupils are equal, round, and reactive to light. Cardiovascular:      Rate and Rhythm: Normal rate and regular rhythm. Heart sounds: Normal heart sounds. No murmur heard.   Pulmonary:      Effort: Pulmonary effort is normal. No respiratory distress. Breath sounds: Normal breath sounds. No wheezing or rales. Chest:      Chest wall: No tenderness. Abdominal:      General: Bowel sounds are normal.      Palpations: Abdomen is soft. Tenderness: There is no abdominal tenderness. There is no guarding or rebound. Musculoskeletal:      Cervical back: Normal range of motion and neck supple. Skin:     General: Skin is warm and dry. Neurological:      Mental Status: She is alert and oriented to person, place, and time. Cranial Nerves: No cranial nerve deficit. Coordination: Coordination normal.        Procedures     MDM       EKG: This EKG is signed and interpreted by me. Rate: 75  Rhythm: Sinus  Interpretation: no acute changes, non-specific EKG, and old inferior infarct. Question new nonacute anterior infarct compared to comparison. Comparison: changes compared to previous EKG of 6/3/2022.    2:57 AM EDT  Discussed results with the patient. She states she feels much better. She describes an odd sensation in her throat. No obvious abnormality on examination. Thyroid appears normal.  No difficulty swallowing. She will follow-up with her primary care physician today. Discussed reasons for immediate return and she is able to recount these.           --------------------------------------------- PAST HISTORY ---------------------------------------------  Past Medical History:  has a past medical history of Diverticulitis, H/O cardiovascular stress test, and Pneumonia. Past Surgical History:  has a past surgical history that includes Cholecystectomy; Upper gastrointestinal endoscopy (N/A, 4/12/2021); and Colonoscopy (N/A, 4/12/2021). Social History:  reports that she has never smoked. She has never used smokeless tobacco. She reports that she does not drink alcohol and does not use drugs.     Family History: family history includes Other in her father and mother; Stroke in her maternal grandfather and maternal grandmother. The patients home medications have been reviewed. Allergies: Demerol and Levofloxacin    -------------------------------------------------- RESULTS -------------------------------------------------  Labs:  Results for orders placed or performed during the hospital encounter of 07/28/22   Strep Screen Group A Throat    Specimen: Throat   Result Value Ref Range    Strep Grp A PCR Negative Negative   COVID-19, Rapid    Specimen: Nasopharyngeal Swab   Result Value Ref Range    SARS-CoV-2, NAAT Not Detected Not Detected   Rapid influenza A/B antigens    Specimen: Nasopharyngeal   Result Value Ref Range    Influenza A by PCR Not Detected Not Detected    Influenza B by PCR Not Detected Not Detected   Basic metabolic panel   Result Value Ref Range    Sodium 137 132 - 146 mmol/L    Potassium 3.8 3.5 - 5.0 mmol/L    Chloride 103 98 - 107 mmol/L    CO2 23 22 - 29 mmol/L    Anion Gap 11 7 - 16 mmol/L    Glucose 107 (H) 74 - 99 mg/dL    BUN 9 6 - 23 mg/dL    Creatinine 0.7 0.5 - 1.0 mg/dL    GFR Non-African American >60 >=60 mL/min/1.73    GFR African American >60     Calcium 9.4 8.6 - 10.2 mg/dL   CBC   Result Value Ref Range    WBC 6.0 4.5 - 11.5 E9/L    RBC 4.73 3.50 - 5.50 E12/L    Hemoglobin 13.7 11.5 - 15.5 g/dL    Hematocrit 40.2 34.0 - 48.0 %    MCV 85.0 80.0 - 99.9 fL    MCH 29.0 26.0 - 35.0 pg    MCHC 34.1 32.0 - 34.5 %    RDW 12.6 11.5 - 15.0 fL    Platelets 672 969 - 152 E9/L    MPV 9.6 7.0 - 12.0 fL   Troponin   Result Value Ref Range    Troponin, High Sensitivity 9 0 - 9 ng/L       Radiology:  XR CHEST PORTABLE   Final Result   No radiographic evidence of acute cardiopulmonary disease.             ------------------------- NURSING NOTES AND VITALS REVIEWED ---------------------------  Date / Time Roomed:  7/28/2022 12:14 AM  ED Bed Assignment:  03/03    The nursing notes within the ED encounter and vital signs as below have been reviewed.    /72   Pulse 64   Temp 98.6 °F (37 °C) (Infrared)   Resp 20   Ht 5' 10\" (1.778 m)   Wt 189 lb (85.7 kg)   SpO2 94%   BMI 27.12 kg/m²   Oxygen Saturation Interpretation: Normal      ------------------------------------------ PROGRESS NOTES ------------------------------------------  2:58 AM EDT  I have spoken with the patient and discussed todays results, in addition to providing specific details for the plan of care and counseling regarding the diagnosis and prognosis. Their questions are answered at this time and they are agreeable with the plan. I discussed at length with them reasons for immediate return here for re evaluation. They will followup with their primary care physician by calling their office  today .      --------------------------------- ADDITIONAL PROVIDER NOTES ---------------------------------  At this time the patient is without objective evidence of an acute process requiring hospitalization or inpatient management. They have remained hemodynamically stable throughout their entire ED visit and are stable for discharge with outpatient follow-up. The plan has been discussed in detail and they are aware of the specific conditions for emergent return, as well as the importance of follow-up. New Prescriptions    No medications on file       Diagnosis:  1. Chest pain, unspecified type        Disposition:  Patient's disposition: Discharge to home  Patient's condition is stable.          Yola Leach [place]  07/28/22 [id number]

## 2022-12-28 NOTE — PLAN OF CARE
Problem: Pain:  Goal: Pain level will decrease  Description: Pain level will decrease  Outcome: Met This Shift  Goal: Control of acute pain  Description: Control of acute pain  Outcome: Met This Shift     Problem: Activity:  Goal: Risk for activity intolerance will decrease  Description: Risk for activity intolerance will decrease  Outcome: Met This Shift     Problem:  Bowel/Gastric:  Goal: Bowel function will improve  Description: Bowel function will improve  Outcome: Met This Shift     Problem: Sensory:  Goal: Pain level will decrease  Description: Pain level will decrease  Outcome: Met This Shift [Negative] : Heme/Lymph

## 2023-08-01 ENCOUNTER — INITIAL CONSULT (OUTPATIENT)
Dept: SURGERY | Age: 73
End: 2023-08-01
Payer: MEDICARE

## 2023-08-01 ENCOUNTER — TELEPHONE (OUTPATIENT)
Dept: SURGERY | Age: 73
End: 2023-08-01

## 2023-08-01 VITALS
BODY MASS INDEX: 27.06 KG/M2 | WEIGHT: 189 LBS | HEART RATE: 72 BPM | SYSTOLIC BLOOD PRESSURE: 122 MMHG | TEMPERATURE: 97 F | HEIGHT: 70 IN | DIASTOLIC BLOOD PRESSURE: 77 MMHG

## 2023-08-01 DIAGNOSIS — K21.9 GASTROESOPHAGEAL REFLUX DISEASE, UNSPECIFIED WHETHER ESOPHAGITIS PRESENT: Primary | ICD-10-CM

## 2023-08-01 PROCEDURE — 1123F ACP DISCUSS/DSCN MKR DOCD: CPT | Performed by: SURGERY

## 2023-08-01 PROCEDURE — 99213 OFFICE O/P EST LOW 20 MIN: CPT | Performed by: SURGERY

## 2023-08-01 RX ORDER — LISINOPRIL 2.5 MG/1
2.5 TABLET ORAL DAILY
COMMUNITY
Start: 2023-05-23

## 2023-08-01 RX ORDER — CITALOPRAM 20 MG/1
20 TABLET ORAL DAILY
COMMUNITY
Start: 2023-05-23

## 2023-08-01 RX ORDER — CLOPIDOGREL BISULFATE 75 MG/1
75 TABLET ORAL DAILY
COMMUNITY
Start: 2023-05-23

## 2023-08-01 RX ORDER — ASPIRIN 325 MG
325 TABLET ORAL DAILY
COMMUNITY
Start: 2023-05-23

## 2023-08-01 RX ORDER — SIMVASTATIN 40 MG
40 TABLET ORAL DAILY
COMMUNITY
Start: 2023-05-23

## 2023-08-01 NOTE — TELEPHONE ENCOUNTER
Prior Authorization Form:      DEMOGRAPHICS:                     Patient Name:  Jennifer Palmer  Patient :  1950            Insurance:  Payor: [employer] / Plan: Lm [place] ESSENTIAL/PLUS / Product Type: *No Product type* /   Insurance ID Number:    Payer/Plan Subscr  Sex Relation Sub. Ins. ID Effective Group Num   1.  BCBS MEDICAREThreasa Spencer 1950 Female Self [id number] [facility]                                    [id number]         DIAGNOSIS & PROCEDURE:                       Procedure/Operation: EGD and Manometry           CPT Code: 52215    Diagnosis:  GERD    ICD10 Code:     Location:  [address] [place] [address]    Surgeon:  Mimi Manning INFORMATION:                          Date: 2023    Time: TBD              Anesthesia:  MAC/TIVA                                                       Status:  Outpatient        Special Comments:         Electronically signed by Espinoza Barrett on 2023 at 11:20 AM

## 2023-08-01 NOTE — PROGRESS NOTES
also discussed selective elimination of dietary triggers (caffeine, chocolate, spicy foods, food with high fat content, carbonated beverages, and peppermint) in patients who note correlation with GERD symptoms and an improvement in symptoms with elimination. Pending findings would proceed to OR with lap poss robot assisted paraesophageal hernia repair with fundoplication, myofascial flap and poss gastropexy  Discussed the risk, benefits and alternatives of surgery including wound infections, bleeding, scar, recurrent hernia formation, dysphagia, inability to belch or vomit, stricture and need for dilation and the risks of general anesthetic including MI, CVA, sudden death or reactions to anesthetic medications. The patient understands the risks and alternatives and the possibility of converting to an open procedure. All questions were answered to the patient's satisfaction and they freely signed the consent.              Ene Estevez MD

## 2023-08-01 NOTE — TELEPHONE ENCOUNTER
Per the order of Dr. Ochoa Reyes, patient has been scheduled for EGD and Manometry  on 8.14.2023. Patient provided with procedure information during office visit and scheduled for follow up appointment to review results. Patient instructed to please contact our office with any questions. Manometry faxed to [facility] for schedling. Procedure scheduled through [facility]. Dr. Ochoa Reyes to enter orders.   Electronically signed by Jc Blanco on 8/1/23 at 11:20 AM EDT

## 2023-08-10 ENCOUNTER — TELEPHONE (OUTPATIENT)
Dept: ENDOSCOPY | Age: 73
End: 2023-08-10

## 2023-08-10 ENCOUNTER — TELEPHONE (OUTPATIENT)
Dept: SURGERY | Age: 73
End: 2023-08-10

## 2023-08-10 NOTE — TELEPHONE ENCOUNTER
Patient informed us that she did have an abnormal ultrasound of her carotid arteries and does have a blood clot. Patients procedure will be cancelled for now. Once patient is cleared to hold her anticoagulation, she will be rescheduled. Call placed to surgery scheduling to cancel procedure. Follow up appointment cancelled. Call placed to inform patient of the above. Patient not available, message left. Dr. Ashlee Blanc notified.   Electronically signed by Shin Cedeno on 8/10/23 at 3:31 PM EDT

## 2024-11-26 ENCOUNTER — HOSPITAL ENCOUNTER (OUTPATIENT)
Dept: MAMMOGRAPHY | Age: 74
Discharge: HOME | End: 2024-11-28
Attending: OBSTETRICS & GYNECOLOGY
Payer: MEDICARE

## 2024-11-26 DIAGNOSIS — Z12.31 OTHER SCREENING MAMMOGRAM: ICD-10-CM

## 2024-11-26 PROCEDURE — 77063 BREAST TOMOSYNTHESIS BI: CPT

## (undated) DEVICE — GOWN ISOLATN REG YEL M WT MULTIPLY SIDETIE LEV 2

## (undated) DEVICE — KENDALL 450 SERIES MONITORING FOAM ELECTRODE - RECTANGULAR SHAPE ( 3/PK): Brand: KENDALL

## (undated) DEVICE — SPONGE GZ 4IN 4IN 4 PLY N WVN AVANT

## (undated) DEVICE — Device: Brand: DEFENDO VALVE AND CONNECTOR KIT

## (undated) DEVICE — FORCEPS BX L240CM JAW DIA2.8MM L CAP W/ NDL MIC MESH TOOTH

## (undated) DEVICE — CONTAINER SPEC COLL 960ML POLYPR TRIANG GRAD INTAKE/OUTPUT

## (undated) DEVICE — LUBRICANT SURG JELLY ST BACTER TUBE 4.25OZ

## (undated) DEVICE — MASK,FACE,MAXFLUIDPROTECT,SHIELD/ERLPS: Brand: MEDLINE

## (undated) DEVICE — TUBING, SUCTION, 1/4" X 10', STRAIGHT: Brand: MEDLINE

## (undated) DEVICE — KIT BEDSIDE REVITAL OX 500ML

## (undated) DEVICE — 6 X 9  1.75MIL 4-WALL LABGUARD: Brand: MINIGRIP COMMERCIAL LLC

## (undated) DEVICE — COVER,LIGHT HANDLE,FLX,1/PK: Brand: MEDLINE INDUSTRIES, INC.

## (undated) DEVICE — TOWEL,OR,DSP,ST,BLUE,STD,6/PK,12PK/CS: Brand: MEDLINE

## (undated) DEVICE — YANKAUER,BULB TIP,W/O VENT,RIGID,STERILE: Brand: MEDLINE

## (undated) DEVICE — BLOCK BITE 60FR CAREGUARD